# Patient Record
Sex: FEMALE | Race: WHITE | NOT HISPANIC OR LATINO | Employment: UNEMPLOYED | URBAN - NONMETROPOLITAN AREA
[De-identification: names, ages, dates, MRNs, and addresses within clinical notes are randomized per-mention and may not be internally consistent; named-entity substitution may affect disease eponyms.]

---

## 2017-03-23 ENCOUNTER — TRANSCRIBE ORDERS (OUTPATIENT)
Dept: ADMINISTRATIVE | Facility: HOSPITAL | Age: 67
End: 2017-03-23

## 2017-03-23 DIAGNOSIS — Z12.31 VISIT FOR SCREENING MAMMOGRAM: Primary | ICD-10-CM

## 2017-03-23 DIAGNOSIS — Z13.820 SCREENING FOR OSTEOPOROSIS: ICD-10-CM

## 2017-04-27 ENCOUNTER — HOSPITAL ENCOUNTER (OUTPATIENT)
Dept: MAMMOGRAPHY | Facility: HOSPITAL | Age: 67
Discharge: HOME | End: 2017-04-27
Admitting: NURSE PRACTITIONER

## 2017-04-27 ENCOUNTER — HOSPITAL ENCOUNTER (OUTPATIENT)
Dept: BONE DENSITY | Facility: HOSPITAL | Age: 67
Discharge: HOME | End: 2017-04-27

## 2017-04-27 DIAGNOSIS — Z12.31 VISIT FOR SCREENING MAMMOGRAM: ICD-10-CM

## 2017-04-27 DIAGNOSIS — Z13.820 SCREENING FOR OSTEOPOROSIS: ICD-10-CM

## 2017-04-27 PROCEDURE — 77063 BREAST TOMOSYNTHESIS BI: CPT

## 2017-04-27 PROCEDURE — 77080 DXA BONE DENSITY AXIAL: CPT

## 2017-04-27 PROCEDURE — G0202 SCR MAMMO BI INCL CAD: HCPCS | Performed by: RADIOLOGY

## 2017-04-27 PROCEDURE — 77080 DXA BONE DENSITY AXIAL: CPT | Performed by: RADIOLOGY

## 2017-04-27 PROCEDURE — G0202 SCR MAMMO BI INCL CAD: HCPCS

## 2017-04-27 PROCEDURE — 77063 BREAST TOMOSYNTHESIS BI: CPT | Performed by: RADIOLOGY

## 2017-05-09 ENCOUNTER — APPOINTMENT (OUTPATIENT)
Dept: ULTRASOUND IMAGING | Facility: HOSPITAL | Age: 67
End: 2017-05-09
Attending: RADIOLOGY

## 2017-05-10 ENCOUNTER — HOSPITAL ENCOUNTER (OUTPATIENT)
Dept: ULTRASOUND IMAGING | Facility: HOSPITAL | Age: 67
Discharge: HOME | End: 2017-05-10
Attending: RADIOLOGY | Admitting: RADIOLOGY

## 2017-05-10 DIAGNOSIS — R92.8 ABNORMAL MAMMOGRAM: ICD-10-CM

## 2017-05-10 PROCEDURE — 76642 ULTRASOUND BREAST LIMITED: CPT

## 2017-05-10 PROCEDURE — 76642 ULTRASOUND BREAST LIMITED: CPT | Performed by: RADIOLOGY

## 2018-05-11 ENCOUNTER — HOSPITAL ENCOUNTER (OUTPATIENT)
Dept: MAMMOGRAPHY | Facility: HOSPITAL | Age: 68
Discharge: HOME | End: 2018-05-11
Admitting: NURSE PRACTITIONER

## 2018-05-11 DIAGNOSIS — Z12.39 BREAST CANCER SCREENING: ICD-10-CM

## 2018-05-11 PROCEDURE — 77067 SCR MAMMO BI INCL CAD: CPT

## 2018-05-11 PROCEDURE — 77067 SCR MAMMO BI INCL CAD: CPT | Performed by: RADIOLOGY

## 2018-05-11 PROCEDURE — 77063 BREAST TOMOSYNTHESIS BI: CPT | Performed by: RADIOLOGY

## 2018-05-11 PROCEDURE — 77063 BREAST TOMOSYNTHESIS BI: CPT

## 2018-11-15 ENCOUNTER — HOSPITAL ENCOUNTER (EMERGENCY)
Facility: HOSPITAL | Age: 68
Discharge: HOME | End: 2018-11-15
Attending: EMERGENCY MEDICINE | Admitting: EMERGENCY MEDICINE

## 2018-11-15 ENCOUNTER — APPOINTMENT (OUTPATIENT)
Dept: CT IMAGING | Facility: HOSPITAL | Age: 68
End: 2018-11-15

## 2018-11-15 ENCOUNTER — APPOINTMENT (OUTPATIENT)
Dept: GENERAL RADIOLOGY | Facility: HOSPITAL | Age: 68
End: 2018-11-15

## 2018-11-15 VITALS
SYSTOLIC BLOOD PRESSURE: 148 MMHG | BODY MASS INDEX: 26.66 KG/M2 | RESPIRATION RATE: 20 BRPM | HEART RATE: 81 BPM | OXYGEN SATURATION: 97 % | HEIGHT: 65 IN | TEMPERATURE: 98 F | DIASTOLIC BLOOD PRESSURE: 78 MMHG | WEIGHT: 160 LBS

## 2018-11-15 DIAGNOSIS — R42 VERTIGO: Primary | ICD-10-CM

## 2018-11-15 LAB
ALBUMIN SERPL-MCNC: 4.6 G/DL (ref 3.4–4.8)
ALBUMIN/GLOB SERPL: 1.6 G/DL (ref 1.5–2.5)
ALP SERPL-CCNC: 79 U/L (ref 35–104)
ALT SERPL W P-5'-P-CCNC: 21 U/L (ref 10–36)
ANION GAP SERPL CALCULATED.3IONS-SCNC: 5.3 MMOL/L (ref 3.6–11.2)
AST SERPL-CCNC: 23 U/L (ref 10–30)
BACTERIA UR QL AUTO: ABNORMAL /HPF
BASOPHILS # BLD AUTO: 0.04 10*3/MM3 (ref 0–0.3)
BASOPHILS NFR BLD AUTO: 0.5 % (ref 0–2)
BILIRUB SERPL-MCNC: 0.4 MG/DL (ref 0.2–1.8)
BILIRUB UR QL STRIP: NEGATIVE
BNP SERPL-MCNC: 10 PG/ML (ref 0–100)
BUN BLD-MCNC: 10 MG/DL (ref 7–21)
BUN/CREAT SERPL: 12 (ref 7–25)
CALCIUM SPEC-SCNC: 9.6 MG/DL (ref 7.7–10)
CHLORIDE SERPL-SCNC: 105 MMOL/L (ref 99–112)
CLARITY UR: CLEAR
CO2 SERPL-SCNC: 26.7 MMOL/L (ref 24.3–31.9)
COLOR UR: YELLOW
CREAT BLD-MCNC: 0.83 MG/DL (ref 0.43–1.29)
DEPRECATED RDW RBC AUTO: 42.4 FL (ref 37–54)
EOSINOPHIL # BLD AUTO: 0.08 10*3/MM3 (ref 0–0.7)
EOSINOPHIL NFR BLD AUTO: 0.9 % (ref 0–7)
ERYTHROCYTE [DISTWIDTH] IN BLOOD BY AUTOMATED COUNT: 12.8 % (ref 11.5–14.5)
GFR SERPL CREATININE-BSD FRML MDRD: 68 ML/MIN/1.73
GLOBULIN UR ELPH-MCNC: 2.8 GM/DL
GLUCOSE BLD-MCNC: 103 MG/DL (ref 70–110)
GLUCOSE UR STRIP-MCNC: NEGATIVE MG/DL
HCT VFR BLD AUTO: 50.7 % (ref 37–47)
HGB BLD-MCNC: 16.8 G/DL (ref 12–16)
HGB UR QL STRIP.AUTO: NEGATIVE
HYALINE CASTS UR QL AUTO: ABNORMAL /LPF
IMM GRANULOCYTES # BLD: 0.02 10*3/MM3 (ref 0–0.03)
IMM GRANULOCYTES NFR BLD: 0.2 % (ref 0–0.5)
KETONES UR QL STRIP: NEGATIVE
LEUKOCYTE ESTERASE UR QL STRIP.AUTO: ABNORMAL
LYMPHOCYTES # BLD AUTO: 1.35 10*3/MM3 (ref 1–3)
LYMPHOCYTES NFR BLD AUTO: 16 % (ref 16–46)
MCH RBC QN AUTO: 30.6 PG (ref 27–33)
MCHC RBC AUTO-ENTMCNC: 33.1 G/DL (ref 33–37)
MCV RBC AUTO: 92.3 FL (ref 80–94)
MONOCYTES # BLD AUTO: 0.64 10*3/MM3 (ref 0.1–0.9)
MONOCYTES NFR BLD AUTO: 7.6 % (ref 0–12)
NEUTROPHILS # BLD AUTO: 6.33 10*3/MM3 (ref 1.4–6.5)
NEUTROPHILS NFR BLD AUTO: 74.8 % (ref 40–75)
NITRITE UR QL STRIP: NEGATIVE
OSMOLALITY SERPL CALC.SUM OF ELEC: 273.1 MOSM/KG (ref 273–305)
PH UR STRIP.AUTO: 6.5 [PH] (ref 5–8)
PLATELET # BLD AUTO: 229 10*3/MM3 (ref 130–400)
PMV BLD AUTO: 12.5 FL (ref 6–10)
POTASSIUM BLD-SCNC: 4.3 MMOL/L (ref 3.5–5.3)
PROT SERPL-MCNC: 7.4 G/DL (ref 6–8)
PROT UR QL STRIP: NEGATIVE
RBC # BLD AUTO: 5.49 10*6/MM3 (ref 4.2–5.4)
RBC # UR: ABNORMAL /HPF
REF LAB TEST METHOD: ABNORMAL
SODIUM BLD-SCNC: 137 MMOL/L (ref 135–153)
SP GR UR STRIP: <=1.005 (ref 1–1.03)
SQUAMOUS #/AREA URNS HPF: ABNORMAL /HPF
TROPONIN I SERPL-MCNC: <0.006 NG/ML
UROBILINOGEN UR QL STRIP: ABNORMAL
WBC NRBC COR # BLD: 8.46 10*3/MM3 (ref 4.5–12.5)
WBC UR QL AUTO: ABNORMAL /HPF

## 2018-11-15 PROCEDURE — 71045 X-RAY EXAM CHEST 1 VIEW: CPT

## 2018-11-15 PROCEDURE — 84484 ASSAY OF TROPONIN QUANT: CPT | Performed by: PHYSICIAN ASSISTANT

## 2018-11-15 PROCEDURE — 71045 X-RAY EXAM CHEST 1 VIEW: CPT | Performed by: RADIOLOGY

## 2018-11-15 PROCEDURE — 81001 URINALYSIS AUTO W/SCOPE: CPT | Performed by: PHYSICIAN ASSISTANT

## 2018-11-15 PROCEDURE — 70450 CT HEAD/BRAIN W/O DYE: CPT | Performed by: RADIOLOGY

## 2018-11-15 PROCEDURE — 93005 ELECTROCARDIOGRAM TRACING: CPT | Performed by: PHYSICIAN ASSISTANT

## 2018-11-15 PROCEDURE — 85025 COMPLETE CBC W/AUTO DIFF WBC: CPT | Performed by: PHYSICIAN ASSISTANT

## 2018-11-15 PROCEDURE — 80053 COMPREHEN METABOLIC PANEL: CPT | Performed by: PHYSICIAN ASSISTANT

## 2018-11-15 PROCEDURE — 70450 CT HEAD/BRAIN W/O DYE: CPT

## 2018-11-15 PROCEDURE — 99284 EMERGENCY DEPT VISIT MOD MDM: CPT

## 2018-11-15 PROCEDURE — 83880 ASSAY OF NATRIURETIC PEPTIDE: CPT | Performed by: PHYSICIAN ASSISTANT

## 2018-11-15 PROCEDURE — 93010 ELECTROCARDIOGRAM REPORT: CPT | Performed by: INTERNAL MEDICINE

## 2018-11-15 RX ORDER — MECLIZINE HYDROCHLORIDE 25 MG/1
25 TABLET ORAL EVERY 6 HOURS PRN
Qty: 20 TABLET | Refills: 0 | Status: SHIPPED | OUTPATIENT
Start: 2018-11-15

## 2018-11-15 RX ORDER — SODIUM CHLORIDE 0.9 % (FLUSH) 0.9 %
10 SYRINGE (ML) INJECTION AS NEEDED
Status: DISCONTINUED | OUTPATIENT
Start: 2018-11-15 | End: 2018-11-15 | Stop reason: HOSPADM

## 2018-11-15 RX ORDER — MECLIZINE HCL 12.5 MG/1
25 TABLET ORAL ONCE
Status: COMPLETED | OUTPATIENT
Start: 2018-11-15 | End: 2018-11-15

## 2018-11-15 RX ADMIN — MECLIZINE HYDROCHLORIDE 25 MG: 12.5 TABLET, FILM COATED ORAL at 13:34

## 2018-11-15 NOTE — ED NOTES
Cardiac monitor showing sinus rhythm 75....p-75 r-20 b/p 155/75     Camelia Sosa, RN  11/15/18 9513

## 2018-11-15 NOTE — ED PROVIDER NOTES
Subjective   68-year-old female who presents to the ED today for dizziness.  This started around 11 AM.  She states she was sitting in a chair and began to feel very dizzy.  She states everything was spinning.  She states she tried to get up to move to a different chair and she was stumbling around.  She states she sat down in a recliner and laid back and that helped.  She states the symptoms lasted for about 5 or 10 minutes and then went away.  She states she has had similar episodes in the past but never this bad.  She denies any nausea or vomiting.  She denies any headache.  She states she did feel short of breath while she was dizzy.  She denies any chest pain.  She states she did take an aspirin prior to arrival.        History provided by:  Patient  Dizziness   Quality:  Room spinning and head spinning  Severity:  Severe  Onset quality:  Sudden  Duration: 5-10 minutes.  Timing:  Constant  Progression:  Improving  Chronicity:  Recurrent  Context: inactivity    Relieved by:  Lying down  Worsened by:  Movement and standing up  Associated symptoms: shortness of breath    Associated symptoms: no blood in stool, no chest pain, no diarrhea, no headaches, no hearing loss, no nausea, no palpitations, no syncope, no tinnitus, no vision changes, no vomiting and no weakness        Review of Systems   Constitutional: Negative.    HENT: Negative for hearing loss and tinnitus.    Eyes: Negative.    Respiratory: Positive for shortness of breath.    Cardiovascular: Negative for chest pain, palpitations and syncope.   Gastrointestinal: Negative for blood in stool, diarrhea, nausea and vomiting.   Genitourinary: Negative.    Musculoskeletal: Negative.    Skin: Negative.    Neurological: Positive for dizziness. Negative for weakness and headaches.   Psychiatric/Behavioral: Negative.    All other systems reviewed and are negative.      Past Medical History:   Diagnosis Date   • Disease of thyroid gland    • Hyperlipidemia    •  Hypertension        Allergies   Allergen Reactions   • Sulfa Antibiotics Nausea Only   • Pneumococcal Vaccines Swelling       Past Surgical History:   Procedure Laterality Date   • CATARACT EXTRACTION     • CHOLECYSTECTOMY     • CORNEAL TRANSPLANT     • EYE SURGERY     • HYSTERECTOMY         Family History   Problem Relation Age of Onset   • Breast cancer Neg Hx        Social History     Socioeconomic History   • Marital status:      Spouse name: Not on file   • Number of children: Not on file   • Years of education: Not on file   • Highest education level: Not on file   Tobacco Use   • Smoking status: Heavy Tobacco Smoker   Substance and Sexual Activity   • Alcohol use: Yes   • Drug use: No           Objective   Physical Exam   Constitutional: She is oriented to person, place, and time. She appears well-developed and well-nourished. No distress.   HENT:   Head: Normocephalic and atraumatic.   Right Ear: External ear normal.   Left Ear: External ear normal.   Nose: Nose normal.   Mouth/Throat: Oropharynx is clear and moist.   Eyes: Conjunctivae and EOM are normal. Pupils are equal, round, and reactive to light.   Neck: Normal range of motion. Neck supple.   Cardiovascular: Normal rate, regular rhythm, normal heart sounds and intact distal pulses.   Pulmonary/Chest: Effort normal and breath sounds normal. No stridor. She has no wheezes. She has no rales.   Abdominal: Soft. Bowel sounds are normal. There is no tenderness.   Musculoskeletal: Normal range of motion.   Neurological: She is alert and oriented to person, place, and time. No cranial nerve deficit or sensory deficit. She exhibits normal muscle tone. Coordination normal.   Skin: Skin is warm and dry. Capillary refill takes less than 2 seconds.   Psychiatric: She has a normal mood and affect. Her behavior is normal. Judgment and thought content normal.   Nursing note and vitals reviewed.      Procedures           ED Course  ED Course as of Nov 15 1617    Thu Nov 15, 2018   1409 13:56 - sinus rhythm, rate of 66, no acute ischemia, reviewed by Dr. Zheng ECG 12 Lead []   1434 Patient feeling better, all symptoms resolved.  Will discharge home on Meclizine and have her follow up outpatient as needed.  []      ED Course User Index  [] Estrella Cordero, PA                  MDM  Number of Diagnoses or Management Options  Vertigo:      Amount and/or Complexity of Data Reviewed  Clinical lab tests: reviewed  Tests in the radiology section of CPT®: reviewed  Tests in the medicine section of CPT®: reviewed    Patient Progress  Patient progress: improved        Final diagnoses:   Vertigo            Estrella Cordero PA  11/15/18 3072

## 2018-12-14 DIAGNOSIS — M25.521 RIGHT ELBOW PAIN: Primary | ICD-10-CM

## 2018-12-17 ENCOUNTER — OFFICE VISIT (OUTPATIENT)
Dept: ORTHOPEDIC SURGERY | Facility: CLINIC | Age: 68
End: 2018-12-17

## 2018-12-17 ENCOUNTER — HOSPITAL ENCOUNTER (OUTPATIENT)
Dept: GENERAL RADIOLOGY | Facility: HOSPITAL | Age: 68
Discharge: HOME | End: 2018-12-17
Attending: ORTHOPAEDIC SURGERY | Admitting: ORTHOPAEDIC SURGERY

## 2018-12-17 VITALS
HEIGHT: 66 IN | BODY MASS INDEX: 25.71 KG/M2 | SYSTOLIC BLOOD PRESSURE: 117 MMHG | WEIGHT: 160 LBS | DIASTOLIC BLOOD PRESSURE: 82 MMHG | HEART RATE: 80 BPM

## 2018-12-17 DIAGNOSIS — M70.21 OLECRANON BURSITIS, RIGHT ELBOW: Primary | ICD-10-CM

## 2018-12-17 DIAGNOSIS — M25.521 RIGHT ELBOW PAIN: ICD-10-CM

## 2018-12-17 PROCEDURE — 73080 X-RAY EXAM OF ELBOW: CPT | Performed by: RADIOLOGY

## 2018-12-17 PROCEDURE — 99406 BEHAV CHNG SMOKING 3-10 MIN: CPT | Performed by: ORTHOPAEDIC SURGERY

## 2018-12-17 PROCEDURE — 20605 DRAIN/INJ JOINT/BURSA W/O US: CPT | Performed by: ORTHOPAEDIC SURGERY

## 2018-12-17 PROCEDURE — 99203 OFFICE O/P NEW LOW 30 MIN: CPT | Performed by: ORTHOPAEDIC SURGERY

## 2018-12-17 PROCEDURE — 73080 X-RAY EXAM OF ELBOW: CPT

## 2018-12-17 RX ORDER — ASPIRIN 81 MG/1
TABLET ORAL
Refills: 1 | COMMUNITY
Start: 2018-10-12

## 2018-12-17 RX ORDER — LISINOPRIL 20 MG/1
1 TABLET ORAL
COMMUNITY
Start: 2018-12-04

## 2018-12-17 RX ORDER — PREDNISOLONE ACETATE 10 MG/ML
SUSPENSION/ DROPS OPHTHALMIC
Refills: 6 | COMMUNITY
Start: 2018-10-12

## 2018-12-17 RX ADMIN — LIDOCAINE HYDROCHLORIDE 5 ML: 20 INJECTION, SOLUTION INFILTRATION; PERINEURAL at 13:52

## 2018-12-17 RX ADMIN — METHYLPREDNISOLONE ACETATE 40 MG: 40 INJECTION, SUSPENSION INTRA-ARTICULAR; INTRALESIONAL; INTRAMUSCULAR; SOFT TISSUE at 13:52

## 2018-12-17 NOTE — PROGRESS NOTES
New Patient Visit      Patient: Christal Perez  YOB: 1950  Date of Encounter: 12/17/2018        Chief Complaint:   Chief Complaint   Patient presents with   • Right Elbow - Edema       HPI:   Christal Perez, 68 y.o. female, referred by Jose Coley APRN presents for evaluation of right elbow pain and swelling.  She's had a few episodes this past summer bumping her elbow, this seemed to make her pain worse.  When she developed a small swelling there, she had several other minor injuries which again aggravated the swelling and pain.  She denies fever chills or night sweats..  Denies swelling in her elbow in the past.    Active Problem List:  Patient Active Problem List   Diagnosis   • Hypertension   • Right elbow pain   • Osteoporosis       Past Medical History:  Past Medical History:   Diagnosis Date   • Disease of thyroid gland    • Hyperlipidemia    • Hypertension        Past Surgical History:  Past Surgical History:   Procedure Laterality Date   • CATARACT EXTRACTION     • CHOLECYSTECTOMY     • CORNEAL TRANSPLANT     • EYE SURGERY     • HYSTERECTOMY         Family History:  Family History   Problem Relation Age of Onset   • Breast cancer Neg Hx        Social History:  Social History     Socioeconomic History   • Marital status:      Spouse name: Not on file   • Number of children: Not on file   • Years of education: Not on file   • Highest education level: Not on file   Social Needs   • Financial resource strain: Not on file   • Food insecurity - worry: Not on file   • Food insecurity - inability: Not on file   • Transportation needs - medical: Not on file   • Transportation needs - non-medical: Not on file   Occupational History   • Not on file   Tobacco Use   • Smoking status: Heavy Tobacco Smoker   • Smokeless tobacco: Never Used   Substance and Sexual Activity   • Alcohol use: Yes   • Drug use: No   • Sexual activity: Not on file   Other Topics Concern   • Not on file   Social History  "Narrative   • Not on file     Patient's Body mass index is 26.22 kg/m². BMI is above normal parameters. Recommendations include: educational material.  I advised Christal of the risks of continuing to use tobacco, and I provided her with tobacco cessation educational materials in the After Visit Summary.     During this visit, I spent 3 minutes counseling the patient regarding tobacco cessation.        Medications:  Current Outpatient Medications   Medication Sig Dispense Refill   • aspirin 81 MG EC tablet TAKE ONE TABLET BY MOUTH EVERY DAY FOR CIRCULATION  1   • lisinopril (PRINIVIL,ZESTRIL) 20 MG tablet Take 1 tablet by mouth.     • meclizine (ANTIVERT) 25 MG tablet Take 1 tablet by mouth Every 6 (Six) Hours As Needed for dizziness. 20 tablet 0   • prednisoLONE acetate (PRED FORTE) 1 % ophthalmic suspension INSTILL 1 DROP TO THE RIGHT EYE EVERY DAY  6     No current facility-administered medications for this visit.        Allergies:  Allergies   Allergen Reactions   • Sulfa Antibiotics Nausea Only   • Pneumococcal Vaccines Swelling       Review of Systems:   Review of Systems   Constitutional: Negative.    HENT: Positive for hearing loss.    Eyes: Positive for redness.   Respiratory: Positive for cough.    Cardiovascular: Negative.    Gastrointestinal: Negative.    Endocrine: Negative.    Genitourinary: Negative.    Musculoskeletal: Positive for arthralgias, back pain and joint swelling.   Skin: Negative.    Allergic/Immunologic: Negative.    Neurological: Negative.    Hematological: Negative.    Psychiatric/Behavioral: Negative.        Physical Exam:   Physical Exam  GENERAL: 68 y.o. female, alert and oriented X 3 in no acute distress.   Visit Vitals  /82   Pulse 80   Ht 166.4 cm (65.5\")   Wt 72.6 kg (160 lb)   BMI 26.22 kg/m²     Musculoskeletal: Right elbow evaluation reveals full mobility no instability normal neurovascular status. She has 3 x 4 cm mass posterior aspect of the olecranon with mild " surrounding erythema and warmth.  Neurovascular grossly intact.      Radiology/Labs: Radiographs right elbow AP lateral are negative by report and by review.       Assessment & Plan:   68 y.o. female with olecranon bursitis right elbow, today she underwent aspiration removing 8 cc of serous fluid nonpurulent then injecting Depo-Medrol 40 mg within the olecranon bursa.  Anticipate good response, she is provided a compression dressing today to be removed tonight or tomorrow. She'll follow-up as needed.      ICD-10-CM ICD-9-CM   1. Olecranon bursitis, right elbow M70.21 726.33   2. Right elbow pain M25.521 719.42       Medium Joint Arthrocentesis: R olecranon bursa  Date/Time: 12/17/2018 1:52 PM  Consent given by: patient  Site marked: site marked  Timeout: Immediately prior to procedure a time out was called to verify the correct patient, procedure, equipment, support staff and site/side marked as required   Supporting Documentation  Indications: pain and joint swelling   Procedure Details  Location: elbow - R olecranon bursa  Preparation: Patient was prepped and draped in the usual sterile fashion  Needle size: 18 G  Approach: anterolateral  Medications administered: 5 mL lidocaine 2%; 40 mg methylPREDNISolone acetate 40 MG/ML  Aspirate amount: 8 mL  Aspirate: serous  Patient tolerance: patient tolerated the procedure well with no immediate complications                Cc:   Jose Coley APRN          Scribed for Lion Ga MD by Vanessa Ga RN.1:51 PM 12/17/2018

## 2018-12-18 PROBLEM — M25.521 RIGHT ELBOW PAIN: Status: ACTIVE | Noted: 2018-12-18

## 2018-12-18 PROBLEM — I10 HYPERTENSION: Status: ACTIVE | Noted: 2018-12-18

## 2018-12-18 PROBLEM — M81.0 OSTEOPOROSIS: Status: ACTIVE | Noted: 2018-12-18

## 2018-12-19 RX ORDER — LIDOCAINE HYDROCHLORIDE 20 MG/ML
5 INJECTION, SOLUTION INFILTRATION; PERINEURAL
Status: COMPLETED | OUTPATIENT
Start: 2018-12-17 | End: 2018-12-17

## 2018-12-19 RX ORDER — METHYLPREDNISOLONE ACETATE 40 MG/ML
40 INJECTION, SUSPENSION INTRA-ARTICULAR; INTRALESIONAL; INTRAMUSCULAR; SOFT TISSUE
Status: COMPLETED | OUTPATIENT
Start: 2018-12-17 | End: 2018-12-17

## 2019-05-14 ENCOUNTER — HOSPITAL ENCOUNTER (OUTPATIENT)
Dept: MAMMOGRAPHY | Facility: HOSPITAL | Age: 69
Discharge: HOME | End: 2019-05-14
Admitting: NURSE PRACTITIONER

## 2019-05-14 DIAGNOSIS — Z12.39 SCREENING BREAST EXAMINATION: ICD-10-CM

## 2019-05-14 PROCEDURE — 77063 BREAST TOMOSYNTHESIS BI: CPT

## 2019-05-14 PROCEDURE — 77067 SCR MAMMO BI INCL CAD: CPT | Performed by: RADIOLOGY

## 2019-05-14 PROCEDURE — 77063 BREAST TOMOSYNTHESIS BI: CPT | Performed by: RADIOLOGY

## 2019-05-14 PROCEDURE — 77067 SCR MAMMO BI INCL CAD: CPT

## 2019-07-24 ENCOUNTER — TRANSCRIBE ORDERS (OUTPATIENT)
Dept: ADMINISTRATIVE | Facility: HOSPITAL | Age: 69
End: 2019-07-24

## 2019-07-24 DIAGNOSIS — E04.9 THYROID GOITER: Primary | ICD-10-CM

## 2019-07-29 ENCOUNTER — HOSPITAL ENCOUNTER (OUTPATIENT)
Dept: ULTRASOUND IMAGING | Facility: HOSPITAL | Age: 69
Discharge: HOME | End: 2019-07-29
Admitting: NURSE PRACTITIONER

## 2019-07-29 DIAGNOSIS — E04.9 THYROID GOITER: ICD-10-CM

## 2019-07-29 PROCEDURE — 76536 US EXAM OF HEAD AND NECK: CPT

## 2019-07-29 PROCEDURE — 76536 US EXAM OF HEAD AND NECK: CPT | Performed by: RADIOLOGY

## 2024-04-10 ENCOUNTER — TRANSCRIBE ORDERS (OUTPATIENT)
Dept: ADMINISTRATIVE | Facility: HOSPITAL | Age: 74
End: 2024-04-10
Payer: MEDICARE

## 2024-04-10 ENCOUNTER — HOSPITAL ENCOUNTER (OUTPATIENT)
Dept: CARDIOLOGY | Facility: HOSPITAL | Age: 74
Discharge: HOME | End: 2024-04-10
Payer: MEDICARE

## 2024-04-10 ENCOUNTER — HOSPITAL ENCOUNTER (OUTPATIENT)
Facility: HOSPITAL | Age: 74
Discharge: HOME | End: 2024-04-10
Payer: MEDICARE

## 2024-04-10 ENCOUNTER — HOSPITAL ENCOUNTER (OUTPATIENT)
Dept: CT IMAGING | Facility: HOSPITAL | Age: 74
Discharge: HOME | End: 2024-04-10
Payer: MEDICARE

## 2024-04-10 DIAGNOSIS — R60.0 LOCALIZED EDEMA: ICD-10-CM

## 2024-04-10 DIAGNOSIS — R05.9 COUGH, UNSPECIFIED TYPE: ICD-10-CM

## 2024-04-10 DIAGNOSIS — R91.1 PULMONARY NODULE: ICD-10-CM

## 2024-04-10 DIAGNOSIS — R91.1 PULMONARY NODULE: Primary | ICD-10-CM

## 2024-04-10 PROCEDURE — 93971 EXTREMITY STUDY: CPT | Performed by: RADIOLOGY

## 2024-04-10 PROCEDURE — 93971 EXTREMITY STUDY: CPT

## 2024-04-10 PROCEDURE — 71260 CT THORAX DX C+: CPT

## 2024-04-10 PROCEDURE — 25510000001 IOPAMIDOL 61 % SOLUTION: Performed by: NURSE PRACTITIONER

## 2024-04-10 RX ADMIN — IOPAMIDOL 80 ML: 612 INJECTION, SOLUTION INTRAVENOUS at 13:43

## 2024-04-29 LAB — CREAT BLDA-MCNC: 0.8 MG/DL (ref 0.6–1.3)

## 2024-07-29 ENCOUNTER — OFFICE VISIT (OUTPATIENT)
Dept: ENDOCRINOLOGY | Facility: CLINIC | Age: 74
End: 2024-07-29
Payer: MEDICARE

## 2024-07-29 VITALS
OXYGEN SATURATION: 94 % | SYSTOLIC BLOOD PRESSURE: 110 MMHG | WEIGHT: 152.8 LBS | HEIGHT: 65 IN | HEART RATE: 74 BPM | DIASTOLIC BLOOD PRESSURE: 73 MMHG | BODY MASS INDEX: 25.46 KG/M2

## 2024-07-29 DIAGNOSIS — E05.90 HYPERTHYROIDISM: Primary | ICD-10-CM

## 2024-07-29 PROCEDURE — 84443 ASSAY THYROID STIM HORMONE: CPT | Performed by: NURSE PRACTITIONER

## 2024-07-29 PROCEDURE — 86800 THYROGLOBULIN ANTIBODY: CPT | Performed by: NURSE PRACTITIONER

## 2024-07-29 PROCEDURE — 83520 IMMUNOASSAY QUANT NOS NONAB: CPT | Performed by: NURSE PRACTITIONER

## 2024-07-29 PROCEDURE — 84439 ASSAY OF FREE THYROXINE: CPT | Performed by: NURSE PRACTITIONER

## 2024-07-29 PROCEDURE — 84481 FREE ASSAY (FT-3): CPT | Performed by: NURSE PRACTITIONER

## 2024-07-29 PROCEDURE — 86376 MICROSOMAL ANTIBODY EACH: CPT | Performed by: NURSE PRACTITIONER

## 2024-07-29 PROCEDURE — 84445 ASSAY OF TSI GLOBULIN: CPT | Performed by: NURSE PRACTITIONER

## 2024-07-29 RX ORDER — ALBUTEROL SULFATE 90 UG/1
AEROSOL, METERED RESPIRATORY (INHALATION)
COMMUNITY

## 2024-07-29 NOTE — ASSESSMENT & PLAN NOTE
-Clinically euthyroid.  -TFT's repeated today with antibody testing to determine underlying cause of hyperthyroidism.  -Discussed underlying causes of hyperthyroidism with patient.  Will repeat ultrasound thyroid to determine size of make-up of nodules present.  Discussed that there may be a need for another uptake and scan depending on the results of this ultrasound.  -Will determine treatment plan and follow based on underlying cause of hypothyroidism.  -Follow-up in 3 months.

## 2024-07-29 NOTE — PROGRESS NOTES
Chief Complaint   Patient presents with    Hyperthyroidism    Multiple Thyroid Nodules        Referring Provider  No ref. provider found     HPI   Christal Perez is a 74 y.o. female had concerns including Hyperthyroidism and Multiple Thyroid Nodules.   Seen as a new patient.  Hyperthyroidism.  Thyroid nodules.    Patient reports that she was seen by Ernesto and Ward Mcgregor in the past and was told that she had hyperthyroidism at that time.  They were not able to determine underlying cause at that time and patient has not been on any medication for hyperthyroidism.  She did have a negative uptake and scan at this time.  She reports that her levels regulated and did not require medication therapy.  She has been followed by PCP for some time.  She reports that recently she was noted to have abnormal TSH 0.10 L on 05/31/2024.  Reports that she has not had an ultrasound thyroid in some time.  She was sent here for further evaluation and treatment.    Birth state: [place]  Previous history of radiation to face/neck: none  Consuming foods high in iodine: none  Family history of thyroid complications: brother- hypothyroidism, no history of thyroid cancer.    The following portions of the patient's history were reviewed and updated as appropriate: allergies, current medications, past family history, past medical history, past social history, past surgical history, and problem list.  Past Medical History:   Diagnosis Date    Disease of thyroid gland     Hyperlipidemia     Hypertension      Past Surgical History:   Procedure Laterality Date    CATARACT EXTRACTION      CHOLECYSTECTOMY      CORNEAL TRANSPLANT      EYE SURGERY      HYSTERECTOMY        Family History   Problem Relation Age of Onset    Breast cancer Neg Hx       Social History     Socioeconomic History    Marital status:    Tobacco Use    Smoking status: Heavy Smoker    Smokeless tobacco: Never   Substance and Sexual Activity    Alcohol use: Yes    Drug use: No     "  Allergies   Allergen Reactions    Sulfa Antibiotics Nausea Only    Pneumococcal Vaccines Swelling      Current Outpatient Medications on File Prior to Visit   Medication Sig Dispense Refill    albuterol sulfate  (90 Base) MCG/ACT inhaler Inhalation for 17 Days      aspirin 81 MG EC tablet TAKE ONE TABLET BY MOUTH EVERY DAY FOR CIRCULATION  1    lisinopril (PRINIVIL,ZESTRIL) 20 MG tablet Take 1 tablet by mouth.      meclizine (ANTIVERT) 25 MG tablet Take 1 tablet by mouth Every 6 (Six) Hours As Needed for dizziness. 20 tablet 0    prednisoLONE acetate (PRED FORTE) 1 % ophthalmic suspension INSTILL 1 DROP TO THE RIGHT EYE EVERY DAY  6     No current facility-administered medications on file prior to visit.      Review of Systems   Constitutional:  Positive for fatigue. Negative for unexpected weight gain and unexpected weight loss.   Eyes:         Cataract surgery, cornea transplant   Respiratory:  Positive for shortness of breath.    Cardiovascular:  Negative for palpitations.   Endocrine: Negative for cold intolerance and heat intolerance.   Neurological:  Positive for tremors.   Psychiatric/Behavioral:  Positive for sleep disturbance. Negative for agitation. The patient is not nervous/anxious.    All other systems reviewed and are negative.    /73 (BP Location: Right arm, Patient Position: Sitting, Cuff Size: Adult)   Pulse 74   Ht 165.1 cm (65\")   Wt 69.3 kg (152 lb 12.8 oz)   SpO2 94%   BMI 25.43 kg/m²      Physical Exam  Vitals reviewed.   Constitutional:       Appearance: Normal appearance.   Eyes:      Extraocular Movements: Extraocular movements intact.   Neck:      Thyroid: Thyroid mass, thyromegaly and thyroid tenderness present.   Cardiovascular:      Rate and Rhythm: Normal rate.   Pulmonary:      Effort: Pulmonary effort is normal.   Skin:     General: Skin is warm.   Neurological:      General: No focal deficit present.      Mental Status: She is alert and oriented to person, place, " "and time.      Comments: Mild tremor noted.   Psychiatric:         Mood and Affect: Mood normal.         Behavior: Behavior normal.         Thought Content: Thought content normal.         Judgment: Judgment normal.       Labs/Imaging  CMP  Lab Results   Component Value Date    GLUCOSE 103 11/15/2018    BUN 10 11/15/2018    CREATININE 0.80 04/10/2024    EGFRIFNONA 68 11/15/2018    BCR 12.0 11/15/2018    K 4.3 11/15/2018    CO2 26.7 11/15/2018    CALCIUM 9.6 11/15/2018    ALBUMIN 4.60 11/15/2018    AST 23 11/15/2018    ALT 21 11/15/2018        CBC w/DIFF   Lab Results   Component Value Date    WBC 8.46 11/15/2018    RBC 5.49 (H) 11/15/2018    HGB 16.8 (H) 11/15/2018    HCT 50.7 (H) 11/15/2018    MCV 92.3 11/15/2018    MCH 30.6 11/15/2018    MCHC 33.1 11/15/2018    RDW 12.8 11/15/2018    RDWSD 42.4 11/15/2018    MPV 12.5 (H) 11/15/2018     11/15/2018    NEUTRORELPCT 74.8 11/15/2018    LYMPHORELPCT 16.0 11/15/2018    MONORELPCT 7.6 11/15/2018    EOSRELPCT 0.9 11/15/2018    BASORELPCT 0.5 11/15/2018    AUTOIGPER 0.2 11/15/2018    NEUTROABS 6.33 11/15/2018    LYMPHSABS 1.35 11/15/2018    MONOSABS 0.64 11/15/2018    EOSABS 0.08 11/15/2018    BASOSABS 0.04 11/15/2018    AUTOIGNUM 0.02 11/15/2018       TSH  Lab Results   Component Value Date    TSH 0.188 (L) 01/05/2016       T4  Lab Results   Component Value Date    FREET4 1.17 01/05/2016     No results found for: \"E4ICTXB\"    T3  No results found for: \"T3FREE\"  No results found for: \"W1NAJPH\"    TRAb  No results found for: \"TSURCPAB\"    TPO  No results found for: \"THYROIDAB\"    No valid procedures specified.    Assessment and Plan    Diagnoses and all orders for this visit:    1. Hyperthyroidism (Primary)  Assessment & Plan:  -Clinically euthyroid.  -TFT's repeated today with antibody testing to determine underlying cause of hyperthyroidism.  -Discussed underlying causes of hyperthyroidism with patient.  Will repeat ultrasound thyroid to determine size of make-up of " nodules present.  Discussed that there may be a need for another uptake and scan depending on the results of this ultrasound.  -Will determine treatment plan and follow based on underlying cause of hypothyroidism.  -Follow-up in 3 months.      Orders:  -     US Thyroid  -     TSH  -     T4, free  -     T3, Free  -     Thyroid Stimulating Immunoglobulin  -     Thyrotropin Receptor Antibody  -     Thyroid Antibodies         Return in about 3 months (around 10/29/2024) for Follow-up appointment. The patient was instructed to contact the clinic with any interval questions or concerns.      This document has been electronically signed by DAVID Garcia  July 29, 2024 16:49 EDT   Endocrinology    Please note that portions of this document were completed with a voice recognition program. Efforts were made to edit the dictations, but occasionally words are mis-transcribed.

## 2024-07-30 ENCOUNTER — OFFICE VISIT (OUTPATIENT)
Dept: PULMONOLOGY | Facility: CLINIC | Age: 74
End: 2024-07-30
Payer: MEDICARE

## 2024-07-30 VITALS
HEART RATE: 83 BPM | WEIGHT: 154.6 LBS | SYSTOLIC BLOOD PRESSURE: 128 MMHG | BODY MASS INDEX: 24.85 KG/M2 | TEMPERATURE: 97.9 F | DIASTOLIC BLOOD PRESSURE: 78 MMHG | HEIGHT: 66 IN | OXYGEN SATURATION: 94 %

## 2024-07-30 DIAGNOSIS — R91.1 PULMONARY NODULE: Primary | ICD-10-CM

## 2024-07-30 DIAGNOSIS — F17.200 SMOKER: ICD-10-CM

## 2024-07-30 DIAGNOSIS — R06.02 SOB (SHORTNESS OF BREATH): ICD-10-CM

## 2024-07-30 DIAGNOSIS — E66.3 OVERWEIGHT (BMI 25.0-29.9): ICD-10-CM

## 2024-07-30 LAB
T3FREE SERPL-MCNC: 3.03 PG/ML (ref 2–4.4)
T4 FREE SERPL-MCNC: 1.43 NG/DL (ref 0.92–1.68)
THYROGLOB AB SERPL-ACNC: <1 IU/ML (ref 0–0.9)
THYROPEROXIDASE AB SERPL-ACNC: <9 IU/ML (ref 0–34)
TSH SERPL DL<=0.05 MIU/L-ACNC: 0.12 UIU/ML (ref 0.27–4.2)

## 2024-07-30 PROCEDURE — 3078F DIAST BP <80 MM HG: CPT | Performed by: INTERNAL MEDICINE

## 2024-07-30 PROCEDURE — 99203 OFFICE O/P NEW LOW 30 MIN: CPT | Performed by: INTERNAL MEDICINE

## 2024-07-30 PROCEDURE — 3074F SYST BP LT 130 MM HG: CPT | Performed by: INTERNAL MEDICINE

## 2024-07-30 NOTE — PROGRESS NOTES
Subjective    Christal Perez presents for the following Lung Nodule (5 mm nodule posteriorly in the right lung)  .    History of Present Illness   Were you born premature?  no    Any Childhood infections? yes      Breathing problems when you were a child? yes    Any childhood allergies?    no             At what age did you begin smoking? 15    Smoking marijuana? no    Any IV drugs? no    How many packs per day? 1    Lung Function Test? no  Chest X-Ray? yes    CT Chest? yes Allergy Test? no    Family hx of Lung disease or Lung Cancer?yes    If FHx is posivitive for lung cancer, what is the relationship of the family member? father and brother(s)    Any hospitalization in the last year? no    How far can you walk without getting short of breath? 175 feet    Any coughing? yes    Any wheezing? yes    Acid Reflux? no    Do you snore? Unknown     Daytime Fatigue? yes    Any pets? no   Any pet allergies? 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     Have you been exposed to any chemicals at your job? no    What inhalers are you currently using? Albuterol     Above-mentioned questionnaire was reviewed by me in great detail and discussed with patient  Review of Systems  Positive for shortness of breath on moderate exertion otherwise negative.  Active Problems:  Problem List Items Addressed This Visit          Endocrine and Metabolic    Overweight (BMI 25.0-29.9)       Pulmonary and Pneumonias    Pulmonary nodule - Primary    Relevant Orders    CT Chest Without Contrast    SOB (shortness of breath)    Relevant Orders    Complete PFT - Pre & Post Bronchodilator       Tobacco    Smoker    Relevant Orders    Complete PFT - Pre & Post Bronchodilator       Past Medical History:  Past Medical History:   Diagnosis Date    Disease of thyroid gland     Hyperlipidemia     Hypertension        Family History:  Family History   Problem Relation Age of Onset    Lymphoma Mother     Lung cancer Father     Lung cancer Brother      "Breast cancer Neg Hx        Social History:  Social History     Tobacco Use    Smoking status: Heavy Smoker     Current packs/day: 1.00     Average packs/day: 1 pack/day for 59.6 years (59.6 ttl pk-yrs)     Types: Cigarettes     Start date: 1965     Passive exposure: Current    Smokeless tobacco: Never   Substance Use Topics    Alcohol use: Not Currently       Current Medications:  Current Outpatient Medications   Medication Sig Dispense Refill    albuterol sulfate  (90 Base) MCG/ACT inhaler Inhalation for 17 Days      aspirin 81 MG EC tablet TAKE ONE TABLET BY MOUTH EVERY DAY FOR CIRCULATION  1    lisinopril (PRINIVIL,ZESTRIL) 20 MG tablet Take 1 tablet by mouth.      prednisoLONE acetate (PRED FORTE) 1 % ophthalmic suspension INSTILL 1 DROP TO THE RIGHT EYE EVERY DAY  6    meclizine (ANTIVERT) 25 MG tablet Take 1 tablet by mouth Every 6 (Six) Hours As Needed for dizziness. (Patient not taking: Reported on 7/30/2024) 20 tablet 0     No current facility-administered medications for this visit.       Allergies:  Allergies   Allergen Reactions    Sulfa Antibiotics Nausea Only    Pneumococcal Vaccines Swelling       Vitals:  /78   Pulse 83   Temp 97.9 °F (36.6 °C)   Ht 166.4 cm (65.5\")   Wt 70.1 kg (154 lb 9.6 oz)   SpO2 94%   BMI 25.34 kg/m²     Imaging:    Imaging Results (Most Recent)       None            Pulmonary Functions Testing Results:    No results found for: \"FEV1\", \"FVC\", \"KHU9MQO\", \"TLC\", \"DLCO\"    Results for orders placed or performed in visit on 07/29/24   TSH    Specimen: Blood   Result Value Ref Range    TSH 0.123 (L) 0.270 - 4.200 uIU/mL   T4, free    Specimen: Blood   Result Value Ref Range    Free T4 1.43 0.92 - 1.68 ng/dL   T3, Free    Specimen: Blood   Result Value Ref Range    T3, Free 3.03 2.00 - 4.40 pg/mL   Thyroid Antibodies    Specimen: Blood   Result Value Ref Range    Thyroid Peroxidase Antibody <9 0 - 34 IU/mL    Thyroglobulin Ab <1.0 0.0 - 0.9 IU/mL       Objective "   Physical Exam  General- normal in appearance, not in any acute distress    HEENT- pupils equally reactive to light, normal in size, no scleral icterus    Neck-supple    Respiratory-respirations normal-on auscultation no wheezing no crackles,     Cardiovascular-  Normal S1 and S2. No S3, S4 or murmurs. No JVD, no carotid bruit and no edema, pulses normal bilaterally     GI-nontender nondistended bowel sounds positive    CNS-nonfocal    Musculoskeletal -no edema  Extremities- no obvious deformity noticed     Psychiatric-mood good, good eye contact, alert awake oriented  Skin- no visible rash      Appointment Information    PACS Images     Radiology Images  Study Result    Narrative & Impression   EXAM: CT CHEST W CONTRAST DIAGNOSTIC-      CLINICAL INDICATION:r91.1,r05.9; R91.1-Solitary pulmonary nodule;  R05.9-Cough, unspecified      COMPARISON: None immediately available.     TECHNIQUE: Multiple axial CT images were obtained from lung apex through  upper abdomen WITH administration of IV contrast. Reformatted images in  the coronal and/or sagittal plane(s) were generated from the axial data  set to facilitate diagnostic accuracy and/or surgical planning.     Radiation dose reduction techniques were utilized per ALARA protocol.  Automated exposure control was initiated through either or CareDose or  DoseRigNovaPlanner software packages by  protocol.    DOSE (DLP mGy-cm):        FINDINGS:     LUNGS: 5 mm nodule posteriorly in the right lung image 64 of axial  series 2     HEART: Unremarkable.     MEDIASTINUM: No masses. No enlarged lymph nodes.  No fluid collections.     PLEURA: No pleural effusion. No pleural mass or abnormal calcification.  No pneumothorax.     VASCULATURE: No evidence of aneurysm.      BONES: Anterolateral right fourth rib fracture of indeterminate age.     VISUALIZED UPPER ABDOMEN:The upper abdomen is unremarkable as  visualized.     Other: None.     IMPRESSION:     1. Proximal nodule  posteriorly in the right lung could represent pleural  scarring.  2. Other findings as above     Assessment & Plan   Pulmonary nodule-will repeat CT chest in 6 months.  Likely related to pleurisy patient had.  As per the patient she had another CAT scan earlier and the pulmonary nodule was 11 mm.  Currently patient is asymptomatic and does not have any chest pain while she takes deeper breaths.      Smoker-did extensive smoking cessation counseling.  Patient is not ready to quit yet.    Shortness of breath-likely related to longstanding smoking history.  Will get PFTs and treat accordingly.      Uxyowsujyp-[id number] to stay active.          ICD-10-CM ICD-9-CM   1. Pulmonary nodule  R91.1 793.11   2. Smoker  F17.200 305.1   3. SOB (shortness of breath)  R06.02 786.05   4. Overweight (BMI 25.0-29.9)  E66.3 278.02       Return in about 3 months (around 10/30/2024).

## 2024-07-31 LAB — TSI SER-ACNC: <0.1 IU/L (ref 0–0.55)

## 2024-08-01 LAB — TSH RECEP AB SER-ACNC: <1.1 IU/L (ref 0–1.75)

## 2024-08-05 RX ORDER — METHIMAZOLE 5 MG/1
5 TABLET ORAL EVERY OTHER DAY
Qty: 45 TABLET | Refills: 1 | Status: SHIPPED | OUTPATIENT
Start: 2024-08-05 | End: 2025-08-05

## 2024-09-03 ENCOUNTER — HOSPITAL ENCOUNTER (OUTPATIENT)
Dept: RESPIRATORY THERAPY | Facility: HOSPITAL | Age: 74
Discharge: HOME | End: 2024-09-03
Admitting: INTERNAL MEDICINE
Payer: MEDICARE

## 2024-09-03 VITALS — HEART RATE: 67 BPM | RESPIRATION RATE: 20 BRPM | OXYGEN SATURATION: 95 %

## 2024-09-03 DIAGNOSIS — R06.02 SOB (SHORTNESS OF BREATH): ICD-10-CM

## 2024-09-03 DIAGNOSIS — F17.200 SMOKER: ICD-10-CM

## 2024-09-03 PROCEDURE — 94726 PLETHYSMOGRAPHY LUNG VOLUMES: CPT

## 2024-09-03 PROCEDURE — 94640 AIRWAY INHALATION TREATMENT: CPT

## 2024-09-03 PROCEDURE — 94060 EVALUATION OF WHEEZING: CPT | Performed by: INTERNAL MEDICINE

## 2024-09-03 PROCEDURE — 94729 DIFFUSING CAPACITY: CPT | Performed by: INTERNAL MEDICINE

## 2024-09-03 PROCEDURE — 94726 PLETHYSMOGRAPHY LUNG VOLUMES: CPT | Performed by: INTERNAL MEDICINE

## 2024-09-03 PROCEDURE — 94060 EVALUATION OF WHEEZING: CPT

## 2024-09-03 PROCEDURE — 94729 DIFFUSING CAPACITY: CPT

## 2024-09-03 PROCEDURE — 94799 UNLISTED PULMONARY SVC/PX: CPT

## 2024-09-03 RX ORDER — ALBUTEROL SULFATE 0.83 MG/ML
2.5 SOLUTION RESPIRATORY (INHALATION) ONCE
Status: COMPLETED | OUTPATIENT
Start: 2024-09-03 | End: 2024-09-03

## 2024-09-03 RX ADMIN — ALBUTEROL SULFATE 2.5 MG: 2.5 SOLUTION RESPIRATORY (INHALATION) at 11:11

## 2024-09-20 ENCOUNTER — HOSPITAL ENCOUNTER (OUTPATIENT)
Dept: ULTRASOUND IMAGING | Facility: HOSPITAL | Age: 74
Discharge: HOME | End: 2024-09-20
Payer: MEDICARE

## 2024-09-20 PROCEDURE — 76536 US EXAM OF HEAD AND NECK: CPT

## 2024-10-30 ENCOUNTER — OFFICE VISIT (OUTPATIENT)
Dept: PULMONOLOGY | Facility: CLINIC | Age: 74
End: 2024-10-30
Payer: MEDICARE

## 2024-10-30 VITALS
OXYGEN SATURATION: 93 % | HEIGHT: 66 IN | SYSTOLIC BLOOD PRESSURE: 128 MMHG | BODY MASS INDEX: 24.11 KG/M2 | WEIGHT: 150 LBS | HEART RATE: 71 BPM | TEMPERATURE: 97.8 F | DIASTOLIC BLOOD PRESSURE: 78 MMHG

## 2024-10-30 DIAGNOSIS — R91.1 PULMONARY NODULE: Primary | ICD-10-CM

## 2024-10-30 DIAGNOSIS — J44.1 COPD WITH ACUTE EXACERBATION: ICD-10-CM

## 2024-10-30 DIAGNOSIS — J44.89 ASTHMA-COPD OVERLAP SYNDROME: ICD-10-CM

## 2024-10-30 DIAGNOSIS — F17.200 SMOKER: ICD-10-CM

## 2024-10-30 PROBLEM — H18.513 FUCHS' CORNEAL DYSTROPHY OF BOTH EYES: Status: ACTIVE | Noted: 2024-10-30

## 2024-10-30 PROCEDURE — 99214 OFFICE O/P EST MOD 30 MIN: CPT | Performed by: PHYSICIAN ASSISTANT

## 2024-10-30 PROCEDURE — 3074F SYST BP LT 130 MM HG: CPT | Performed by: PHYSICIAN ASSISTANT

## 2024-10-30 PROCEDURE — 3078F DIAST BP <80 MM HG: CPT | Performed by: PHYSICIAN ASSISTANT

## 2024-10-30 PROCEDURE — 1159F MED LIST DOCD IN RCRD: CPT | Performed by: PHYSICIAN ASSISTANT

## 2024-10-30 PROCEDURE — 1160F RVW MEDS BY RX/DR IN RCRD: CPT | Performed by: PHYSICIAN ASSISTANT

## 2024-10-30 RX ORDER — PREDNISONE 10 MG/1
20 TABLET ORAL DAILY
Qty: 10 TABLET | Refills: 0 | Status: SHIPPED | OUTPATIENT
Start: 2024-10-30 | End: 2024-11-04

## 2024-10-30 RX ORDER — DOXYCYCLINE 100 MG/1
100 CAPSULE ORAL 2 TIMES DAILY
Qty: 10 CAPSULE | Refills: 0 | Status: SHIPPED | OUTPATIENT
Start: 2024-10-30 | End: 2024-11-04

## 2024-10-30 NOTE — PROGRESS NOTES
"Chief Complaint  Pulmonary nodule    Subjective        Christal Perez presents to [facility]ICINE  History of Present Illness    Mrs. Siegel presents today for follow up of a pulmonary nodule.   She admits that she hs noticed more increased shortness of breath with exertion over time.   Does notice some acute increase of chest congestion and general more shortness of breath within the last few days. Prefers to reduce prednisone use when possible, but will follow any provided recommendations.  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          Objective   Vital Signs:  /78   Pulse 71   Temp 97.8 °F (36.6 °C)   Ht 166.4 cm (65.51\")   Wt 68 kg (150 lb)   SpO2 93%   BMI 24.57 kg/m²   Estimated body mass index is 24.57 kg/m² as calculated from the following:    Height as of this encounter: 166.4 cm (65.51\").    Weight as of this encounter: 68 kg (150 lb).    BMI is within normal parameters. No other follow-up for BMI required.      Physical Exam  Vitals reviewed.   Constitutional:       General: She is not in acute distress.     Appearance: She is not diaphoretic.   HENT:      Head: Normocephalic and atraumatic.   Cardiovascular:      Rate and Rhythm: Normal rate and regular rhythm.   Pulmonary:      Effort: Pulmonary effort is normal.      Breath sounds: Wheezing present. No rhonchi or rales.   Neurological:      Mental Status: She is alert and oriented to person, place, and time.   Psychiatric:         Behavior: Behavior normal.        Result Review :  The following data was reviewed by: Veronica Petersen PA-C on 10/30/2024:      PFT 2024    Moderate obstructive lung disease with significant bronchodilator effect seen on this occasion.  Diffusion capacity is normal.  Mildly reduced.  Lung volumes show significant air trapping.  Flow-volume loop is obstructive    "     -----------------------------------------------------------------------------------    CT chest imaging/report April 2024   - Image 64 - right lower 5 mm nodule (could so be scarring/atelectasis, covers images 63-66).   - Images 44-54 - Left lingular scarring/atelectasis    -----------------------------------------------------------------------------------          Assessment and Plan   Diagnoses and all orders for this visit:    1. Pulmonary nodule (Primary)    2. Asthma-COPD overlap syndrome    3. COPD with acute exacerbation    4. Smoker    Other orders  -     doxycycline (VIBRAMYCIN) 100 MG capsule; Take 1 capsule by mouth 2 (Two) Times a Day for 5 days.  Dispense: 10 capsule; Refill: 0  -     predniSONE (DELTASONE) 10 MG tablet; Take 2 tablets by mouth Daily for 5 days.  Dispense: 10 tablet; Refill: 0        Pulmonary nodule, Cigarette dependendence:   CT chest from April 2024 notable for   - Image 64 - right lower 5 mm nodule (could so be scarring/atelectasis, covers images 63-66).   - Images 44-54 - Left lingular scarring/atelectasis  No prior imaging for comparison  Follow-up imaging ordered during previous visit by Dr. Watts for January 2025.          COPD-asthma overlap with acute exacerbation:   Has moderate COPD with asthmatic overlap per recent PFT.   Discussed results.   Prefers to try more mild inhaler options, avoid steroids unless   Continue albuterol inhaler as needed  Provided sample of Spiriva 1.25 mcg for 2 puffs daily use.   If sample tolerated, can prescribe or escalate therapy  Ordered 5 day doxycyline course  Ordered 5 day prednisone course (recommended 20 mg, but can reduce to 10 mg if better tolerate     Prefers to start minimal as tolerated with therapies, [facility]          Follow Up   Return in about 2 months (around 1/3/2025), or if symptoms worsen or fail to improve, for Recheck.  Patient was given instructions and counseling regarding her condition or for health maintenance advice.  Please see specific information pulled into the AVS if appropriate.

## 2024-10-31 PROBLEM — J44.1 COPD WITH ACUTE EXACERBATION: Status: ACTIVE | Noted: 2024-10-31

## 2024-10-31 PROBLEM — J44.89 ASTHMA-COPD OVERLAP SYNDROME: Status: ACTIVE | Noted: 2024-10-31

## 2024-11-05 ENCOUNTER — DOCUMENTATION (OUTPATIENT)
Dept: PULMONOLOGY | Facility: CLINIC | Age: 74
End: 2024-11-05
Payer: MEDICARE

## 2024-11-05 DIAGNOSIS — J44.89 ASTHMA-COPD OVERLAP SYNDROME: Primary | ICD-10-CM

## 2024-11-05 RX ORDER — TIOTROPIUM BROMIDE INHALATION SPRAY 1.56 UG/1
2 SPRAY, METERED RESPIRATORY (INHALATION)
Qty: 4 G | Refills: 8 | Status: SHIPPED | OUTPATIENT
Start: 2024-11-05 | End: 2024-11-06 | Stop reason: SDUPTHER

## 2024-11-05 NOTE — PROGRESS NOTES
Patient had benefit from the Spiriva Respimat 1.25 mcg and wanted to continue this.  Order sent to pharmacy.      Recently acquired a sample of the Spiriva 2.5 mcg.  MA will let her know that she can try this with 1 to 2 puffs daily.  If better tolerated, we can later upgrade to this instead.

## 2024-11-06 DIAGNOSIS — J44.89 ASTHMA-COPD OVERLAP SYNDROME: ICD-10-CM

## 2024-11-06 RX ORDER — TIOTROPIUM BROMIDE INHALATION SPRAY 1.56 UG/1
2 SPRAY, METERED RESPIRATORY (INHALATION)
Qty: 4 G | Refills: 8 | Status: SHIPPED | OUTPATIENT
Start: 2024-11-06

## 2024-12-04 ENCOUNTER — OFFICE VISIT (OUTPATIENT)
Dept: ENDOCRINOLOGY | Facility: CLINIC | Age: 74
End: 2024-12-04
Payer: MEDICARE

## 2024-12-04 VITALS
DIASTOLIC BLOOD PRESSURE: 70 MMHG | BODY MASS INDEX: 25.49 KG/M2 | SYSTOLIC BLOOD PRESSURE: 133 MMHG | WEIGHT: 155.6 LBS | OXYGEN SATURATION: 91 % | HEART RATE: 66 BPM

## 2024-12-04 DIAGNOSIS — E05.90 HYPERTHYROIDISM: Primary | ICD-10-CM

## 2024-12-04 LAB
ALBUMIN SERPL-MCNC: 4 G/DL (ref 3.5–5.2)
ALBUMIN/GLOB SERPL: 1.3 G/DL
ALP SERPL-CCNC: 79 U/L (ref 39–117)
ALT SERPL W P-5'-P-CCNC: 14 U/L (ref 1–33)
ANION GAP SERPL CALCULATED.3IONS-SCNC: 8.6 MMOL/L (ref 5–15)
AST SERPL-CCNC: 19 U/L (ref 1–32)
BASOPHILS # BLD AUTO: 0.08 10*3/MM3 (ref 0–0.2)
BASOPHILS NFR BLD AUTO: 1.1 % (ref 0–1.5)
BILIRUB SERPL-MCNC: 0.4 MG/DL (ref 0–1.2)
BUN SERPL-MCNC: 9 MG/DL (ref 8–23)
BUN/CREAT SERPL: 11.4 (ref 7–25)
CALCIUM SPEC-SCNC: 9.6 MG/DL (ref 8.6–10.5)
CHLORIDE SERPL-SCNC: 99 MMOL/L (ref 98–107)
CO2 SERPL-SCNC: 27.4 MMOL/L (ref 22–29)
CREAT SERPL-MCNC: 0.79 MG/DL (ref 0.57–1)
DEPRECATED RDW RBC AUTO: 40.3 FL (ref 37–54)
EGFRCR SERPLBLD CKD-EPI 2021: 78.6 ML/MIN/1.73
EOSINOPHIL # BLD AUTO: 0.22 10*3/MM3 (ref 0–0.4)
EOSINOPHIL NFR BLD AUTO: 3.1 % (ref 0.3–6.2)
ERYTHROCYTE [DISTWIDTH] IN BLOOD BY AUTOMATED COUNT: 12 % (ref 12.3–15.4)
GLOBULIN UR ELPH-MCNC: 3 GM/DL
GLUCOSE SERPL-MCNC: 99 MG/DL (ref 65–99)
HCT VFR BLD AUTO: 49.6 % (ref 34–46.6)
HGB BLD-MCNC: 16.1 G/DL (ref 12–15.9)
IMM GRANULOCYTES # BLD AUTO: 0.02 10*3/MM3 (ref 0–0.05)
IMM GRANULOCYTES NFR BLD AUTO: 0.3 % (ref 0–0.5)
LYMPHOCYTES # BLD AUTO: 1.46 10*3/MM3 (ref 0.7–3.1)
LYMPHOCYTES NFR BLD AUTO: 20.8 % (ref 19.6–45.3)
MCH RBC QN AUTO: 29.9 PG (ref 26.6–33)
MCHC RBC AUTO-ENTMCNC: 32.5 G/DL (ref 31.5–35.7)
MCV RBC AUTO: 92 FL (ref 79–97)
MONOCYTES # BLD AUTO: 0.74 10*3/MM3 (ref 0.1–0.9)
MONOCYTES NFR BLD AUTO: 10.6 % (ref 5–12)
NEUTROPHILS NFR BLD AUTO: 4.49 10*3/MM3 (ref 1.7–7)
NEUTROPHILS NFR BLD AUTO: 64.1 % (ref 42.7–76)
NRBC BLD AUTO-RTO: 0 /100 WBC (ref 0–0.2)
PLATELET # BLD AUTO: 283 10*3/MM3 (ref 140–450)
PMV BLD AUTO: 12.5 FL (ref 6–12)
POTASSIUM SERPL-SCNC: 4.2 MMOL/L (ref 3.5–5.2)
PROT SERPL-MCNC: 7 G/DL (ref 6–8.5)
RBC # BLD AUTO: 5.39 10*6/MM3 (ref 3.77–5.28)
SODIUM SERPL-SCNC: 135 MMOL/L (ref 136–145)
T3FREE SERPL-MCNC: 3.05 PG/ML (ref 2–4.4)
T4 FREE SERPL-MCNC: 1.43 NG/DL (ref 0.92–1.68)
TSH SERPL DL<=0.05 MIU/L-ACNC: 0.59 UIU/ML (ref 0.27–4.2)
WBC NRBC COR # BLD AUTO: 7.01 10*3/MM3 (ref 3.4–10.8)

## 2024-12-04 PROCEDURE — 84439 ASSAY OF FREE THYROXINE: CPT | Performed by: NURSE PRACTITIONER

## 2024-12-04 PROCEDURE — 85025 COMPLETE CBC W/AUTO DIFF WBC: CPT | Performed by: NURSE PRACTITIONER

## 2024-12-04 PROCEDURE — 84443 ASSAY THYROID STIM HORMONE: CPT | Performed by: NURSE PRACTITIONER

## 2024-12-04 PROCEDURE — 84481 FREE ASSAY (FT-3): CPT | Performed by: NURSE PRACTITIONER

## 2024-12-04 PROCEDURE — 80053 COMPREHEN METABOLIC PANEL: CPT | Performed by: NURSE PRACTITIONER

## 2024-12-04 NOTE — PROGRESS NOTES
Chief Complaint   Patient presents with    Follow-up        Referring Provider  No ref. provider found     HPI   Christal Perez is a 74 y.o. female had concerns including Follow-up.   Seen as a new patient.  Hyperthyroidism.  Thyroid nodules.    Methimazole 5 mg QOD.  She is noticing improving heart palpitations.    History:  Patient reports that she was seen by Ernesto and Ward Mcgregor in the past and was told that she had hyperthyroidism at that time.  They were not able to determine underlying cause at that time and patient has not been on any medication for hyperthyroidism.  She did have a negative uptake and scan at this time.  She reports that her levels regulated and did not require medication therapy.  She has been followed by PCP for some time.  She reports that recently she was noted to have abnormal TSH 0.10 L on 05/31/2024.  Reports that she has not had an ultrasound thyroid in some time.  She was sent here for further evaluation and treatment.    Birth state: [place]  Previous history of radiation to face/neck: none  Consuming foods high in iodine: none  Family history of thyroid complications: brother- hypothyroidism, no history of thyroid cancer.    The following portions of the patient's history were reviewed and updated as appropriate: allergies, current medications, past family history, past medical history, past social history, past surgical history, and problem list.  Past Medical History:   Diagnosis Date    Disease of thyroid gland     Hyperlipidemia     Hypertension      Past Surgical History:   Procedure Laterality Date    CATARACT EXTRACTION      CHOLECYSTECTOMY      CORNEAL TRANSPLANT      EYE SURGERY      HYSTERECTOMY        Family History   Problem Relation Age of Onset    Lymphoma Mother     Lung cancer Father     Lung cancer Brother     Breast cancer Neg Hx       Social History     Socioeconomic History    Marital status:    Tobacco Use    Smoking status: Heavy Smoker     Current  packs/day: 1.00     Average packs/day: 1 pack/day for 59.9 years (59.9 ttl pk-yrs)     Types: Cigarettes     Start date: 1965     Passive exposure: Current    Smokeless tobacco: Never   Vaping Use    Vaping status: Former    Substances: Nicotine, Flavoring    Devices: Disposable   Substance and Sexual Activity    Alcohol use: Not Currently    Drug use: Yes     Types: Hydromorphone    Sexual activity: Defer      Allergies   Allergen Reactions    Pneumococcal Vaccines Swelling     Swollen where vaccination was given, injection site got red, and itching.    Sulfa Antibiotics Nausea Only    Statins Other (See Comments)     Leg pain and heaviness in legs.      Current Outpatient Medications on File Prior to Visit   Medication Sig Dispense Refill    albuterol sulfate  (90 Base) MCG/ACT inhaler Inhalation for 17 Days      lisinopril (PRINIVIL,ZESTRIL) 20 MG tablet Take 1 tablet by mouth.      methIMAzole (TAPAZOLE) 5 MG tablet Take 1 tablet by mouth Every Other Day. 45 tablet 1    prednisoLONE acetate (PRED FORTE) 1 % ophthalmic suspension INSTILL 1 DROP TO THE RIGHT EYE EVERY DAY  6    Tiotropium Bromide Monohydrate (Spiriva Respimat) 1.25 MCG/ACT aerosol solution inhaler Inhale 2 puffs Daily. 4 g 8    [DISCONTINUED] aspirin 81 MG EC tablet TAKE ONE TABLET BY MOUTH EVERY DAY FOR CIRCULATION (Patient not taking: Reported on 12/4/2024)  1    [DISCONTINUED] meclizine (ANTIVERT) 25 MG tablet Take 1 tablet by mouth Every 6 (Six) Hours As Needed for dizziness. (Patient not taking: Reported on 7/30/2024) 20 tablet 0     No current facility-administered medications on file prior to visit.      Review of Systems   Constitutional:  Positive for fatigue. Negative for unexpected weight gain and unexpected weight loss.   Eyes:         Cataract surgery, cornea transplant   Respiratory:  Positive for shortness of breath.    Cardiovascular:  Negative for palpitations.   Endocrine: Negative for cold intolerance and heat  intolerance.   Neurological:  Positive for tremors.   Psychiatric/Behavioral:  Positive for sleep disturbance. Negative for agitation. The patient is not nervous/anxious.    All other systems reviewed and are negative.    /70 (BP Location: Right arm, Patient Position: Sitting, Cuff Size: Adult)   Pulse 66   Wt 70.6 kg (155 lb 9.6 oz)   SpO2 91%   Breastfeeding No   BMI 25.49 kg/m²      Physical Exam  Vitals reviewed.   Constitutional:       Appearance: Normal appearance.   Eyes:      Extraocular Movements: Extraocular movements intact.   Neck:      Thyroid: Thyroid mass, thyromegaly and thyroid tenderness present.   Cardiovascular:      Rate and Rhythm: Normal rate.   Pulmonary:      Effort: Pulmonary effort is normal.   Skin:     General: Skin is warm.   Neurological:      General: No focal deficit present.      Mental Status: She is alert and oriented to person, place, and time.      Comments: Mild tremor noted.   Psychiatric:         Mood and Affect: Mood normal.         Behavior: Behavior normal.         Thought Content: Thought content normal.         Judgment: Judgment normal.       Labs/Imaging  CMP  Lab Results   Component Value Date    GLUCOSE 103 11/15/2018    BUN 10 11/15/2018    CREATININE 0.80 04/10/2024    EGFRIFNONA 68 11/15/2018    BCR 12.0 11/15/2018    K 4.3 11/15/2018    CO2 26.7 11/15/2018    CALCIUM 9.6 11/15/2018    ALBUMIN 4.60 11/15/2018    AST 23 11/15/2018    ALT 21 11/15/2018        CBC w/DIFF   Lab Results   Component Value Date    WBC 8.46 11/15/2018    RBC 5.49 (H) 11/15/2018    HGB 16.8 (H) 11/15/2018    HCT 50.7 (H) 11/15/2018    MCV 92.3 11/15/2018    MCH 30.6 11/15/2018    MCHC 33.1 11/15/2018    RDW 12.8 11/15/2018    RDWSD 42.4 11/15/2018    MPV 12.5 (H) 11/15/2018     11/15/2018    NEUTRORELPCT 74.8 11/15/2018    LYMPHORELPCT 16.0 11/15/2018    MONORELPCT 7.6 11/15/2018    EOSRELPCT 0.9 11/15/2018    BASORELPCT 0.5 11/15/2018    AUTOIGPER 0.2 11/15/2018     "NEUTROABS 6.33 11/15/2018    LYMPHSABS 1.35 11/15/2018    MONOSABS 0.64 11/15/2018    EOSABS 0.08 11/15/2018    BASOSABS 0.04 11/15/2018    AUTOIGNUM 0.02 11/15/2018       TSH  Lab Results   Component Value Date    TSH 0.123 (L) 07/29/2024    TSH 0.188 (L) 01/05/2016       T4  Lab Results   Component Value Date    FREET4 1.43 07/29/2024    FREET4 1.17 01/05/2016     No results found for: \"H2NEYKA\"    T3  Lab Results   Component Value Date    T3FREE 3.03 07/29/2024     No results found for: \"W9QMISJ\"    TRAb  Lab Results   Component Value Date    TSURCPAB <1.10 07/29/2024       TPO  Lab Results   Component Value Date    THYROIDAB <9 07/29/2024       No valid procedures specified.    Assessment and Plan    There are no diagnoses linked to this encounter.       No follow-ups on file. The patient was instructed to contact the clinic with any interval questions or concerns.      This document has been electronically signed by DAVID Garcia  December 4, 2024 10:00 EST   Endocrinology    Please note that portions of this document were completed with a voice recognition program. Efforts were made to edit the dictations, but occasionally words are mis-transcribed.   "

## 2024-12-04 NOTE — ASSESSMENT & PLAN NOTE
-Clinically euthyroid.  -TFT's today with medication adjustment accordingly.  -Discussed the importance of taking medication regularly without missing doses.  -US Thyroid due in 1 year for follow-up.  -Follow-up in 6 months.

## 2024-12-04 NOTE — PROGRESS NOTES
Chief Complaint   Patient presents with    Follow-up        Referring Provider  No ref. provider found     HPI   Christal Perez is a 74 y.o. female had concerns including Follow-up.   Hyperthyroidism.  Thyroid nodules.    She is currently taking Methimazole 5 mg QD.  She is taking this regularly without missing doses.  She denies any changes to her neck or compressive s/sx's.  She is not currently taking any conflicting medication at this time. She reports that she is having improving symptoms and that her heart palpitations are improving.  She denies any changes at this time.     History:  Patient reports that she was seen by Ernesto and Ward Mcgregor in the past and was told that she had hyperthyroidism at that time.  They were not able to determine underlying cause at that time and patient has not been on any medication for hyperthyroidism.  She did have a negative uptake and scan at this time.  She reports that her levels regulated and did not require medication therapy.  She has been followed by PCP for some time.  She reports that recently she was noted to have abnormal TSH 0.10 L on 05/31/2024.  Reports that she has not had an ultrasound thyroid in some time.  She was sent here for further evaluation and treatment.    Birth state: [place]  Previous history of radiation to face/neck: none  Consuming foods high in iodine: none  Family history of thyroid complications: brother- hypothyroidism, no history of thyroid cancer.    The following portions of the patient's history were reviewed and updated as appropriate: allergies, current medications, past family history, past medical history, past social history, past surgical history, and problem list.  Past Medical History:   Diagnosis Date    Disease of thyroid gland     Hyperlipidemia     Hypertension      Past Surgical History:   Procedure Laterality Date    CATARACT EXTRACTION      CHOLECYSTECTOMY      CORNEAL TRANSPLANT      EYE SURGERY      HYSTERECTOMY        Family  History   Problem Relation Age of Onset    Lymphoma Mother     Lung cancer Father     Lung cancer Brother     Breast cancer Neg Hx       Social History     Socioeconomic History    Marital status:    Tobacco Use    Smoking status: Heavy Smoker     Current packs/day: 1.00     Average packs/day: 1 pack/day for 59.9 years (59.9 ttl pk-yrs)     Types: Cigarettes     Start date: 1965     Passive exposure: Current    Smokeless tobacco: Never   Vaping Use    Vaping status: Former    Substances: Nicotine, Flavoring    Devices: Disposable   Substance and Sexual Activity    Alcohol use: Not Currently    Drug use: Yes     Types: Hydromorphone    Sexual activity: Defer      Allergies   Allergen Reactions    Pneumococcal Vaccines Swelling     Swollen where vaccination was given, injection site got red, and itching.    Sulfa Antibiotics Nausea Only    Statins Other (See Comments)     Leg pain and heaviness in legs.      Current Outpatient Medications on File Prior to Visit   Medication Sig Dispense Refill    albuterol sulfate  (90 Base) MCG/ACT inhaler Inhalation for 17 Days      lisinopril (PRINIVIL,ZESTRIL) 20 MG tablet Take 1 tablet by mouth.      methIMAzole (TAPAZOLE) 5 MG tablet Take 1 tablet by mouth Every Other Day. 45 tablet 1    prednisoLONE acetate (PRED FORTE) 1 % ophthalmic suspension INSTILL 1 DROP TO THE RIGHT EYE EVERY DAY  6    Tiotropium Bromide Monohydrate (Spiriva Respimat) 1.25 MCG/ACT aerosol solution inhaler Inhale 2 puffs Daily. 4 g 8    [DISCONTINUED] aspirin 81 MG EC tablet TAKE ONE TABLET BY MOUTH EVERY DAY FOR CIRCULATION (Patient not taking: Reported on 12/4/2024)  1    [DISCONTINUED] meclizine (ANTIVERT) 25 MG tablet Take 1 tablet by mouth Every 6 (Six) Hours As Needed for dizziness. (Patient not taking: Reported on 7/30/2024) 20 tablet 0     No current facility-administered medications on file prior to visit.      Review of Systems   Constitutional:  Positive for fatigue. Negative for  unexpected weight gain and unexpected weight loss.   Eyes:         Cataract surgery, cornea transplant   Respiratory:  Positive for shortness of breath.    Cardiovascular:  Negative for palpitations.   Endocrine: Negative for cold intolerance and heat intolerance.   Neurological:  Positive for tremors.   Psychiatric/Behavioral:  Positive for sleep disturbance. Negative for agitation. The patient is not nervous/anxious.    All other systems reviewed and are negative.    /70 (BP Location: Right arm, Patient Position: Sitting, Cuff Size: Adult)   Pulse 66   Wt 70.6 kg (155 lb 9.6 oz)   SpO2 91%   Breastfeeding No   BMI 25.49 kg/m²      Physical Exam  Vitals reviewed.   Constitutional:       Appearance: Normal appearance.   Eyes:      Extraocular Movements: Extraocular movements intact.   Neck:      Thyroid: Thyroid mass, thyromegaly and thyroid tenderness present.   Cardiovascular:      Rate and Rhythm: Normal rate.   Pulmonary:      Effort: Pulmonary effort is normal.   Skin:     General: Skin is warm.   Neurological:      General: No focal deficit present.      Mental Status: She is alert and oriented to person, place, and time.      Comments: Mild tremor noted.   Psychiatric:         Mood and Affect: Mood normal.         Behavior: Behavior normal.         Thought Content: Thought content normal.         Judgment: Judgment normal.       Labs/Imaging  CMP  Lab Results   Component Value Date    GLUCOSE 103 11/15/2018    BUN 10 11/15/2018    CREATININE 0.80 04/10/2024    EGFRIFNONA 68 11/15/2018    BCR 12.0 11/15/2018    K 4.3 11/15/2018    CO2 26.7 11/15/2018    CALCIUM 9.6 11/15/2018    ALBUMIN 4.60 11/15/2018    AST 23 11/15/2018    ALT 21 11/15/2018        CBC w/DIFF   Lab Results   Component Value Date    WBC 8.46 11/15/2018    RBC 5.49 (H) 11/15/2018    HGB 16.8 (H) 11/15/2018    HCT 50.7 (H) 11/15/2018    MCV 92.3 11/15/2018    MCH 30.6 11/15/2018    MCHC 33.1 11/15/2018    RDW 12.8 11/15/2018    RDWSD  "42.4 11/15/2018    MPV 12.5 (H) 11/15/2018     11/15/2018    NEUTRORELPCT 74.8 11/15/2018    LYMPHORELPCT 16.0 11/15/2018    MONORELPCT 7.6 11/15/2018    EOSRELPCT 0.9 11/15/2018    BASORELPCT 0.5 11/15/2018    AUTOIGPER 0.2 11/15/2018    NEUTROABS 6.33 11/15/2018    LYMPHSABS 1.35 11/15/2018    MONOSABS 0.64 11/15/2018    EOSABS 0.08 11/15/2018    BASOSABS 0.04 11/15/2018    AUTOIGNUM 0.02 11/15/2018       TSH  Lab Results   Component Value Date    TSH 0.123 (L) 07/29/2024    TSH 0.188 (L) 01/05/2016       T4  Lab Results   Component Value Date    FREET4 1.43 07/29/2024    FREET4 1.17 01/05/2016     No results found for: \"V0OPRCJ\"    T3  Lab Results   Component Value Date    T3FREE 3.03 07/29/2024     No results found for: \"X2NBZPE\"    TRAb  Lab Results   Component Value Date    TSURCPAB <1.10 07/29/2024       TPO  Lab Results   Component Value Date    THYROIDAB <9 07/29/2024       No valid procedures specified.    Assessment and Plan    Diagnoses and all orders for this visit:    1. Hyperthyroidism (Primary)  Assessment & Plan:  -Clinically euthyroid.  -TFT's today with medication adjustment accordingly.  -Discussed the importance of taking medication regularly without missing doses.  -US Thyroid due in 1 year for follow-up.  -Follow-up in 6 months.    Orders:  -     TSH  -     T4, free  -     Comprehensive Metabolic Panel  -     CBC & Differential  -     T3, Free           Return in about 6 months (around 6/4/2025) for Follow-up appointment. The patient was instructed to contact the clinic with any interval questions or concerns.      This document has been electronically signed by DAVID Garcia  December 4, 2024 10:16 EST   Endocrinology    Please note that portions of this document were completed with a voice recognition program. Efforts were made to edit the dictations, but occasionally words are mis-transcribed.   "

## 2024-12-09 RX ORDER — METHIMAZOLE 5 MG/1
5 TABLET ORAL EVERY OTHER DAY
Qty: 45 TABLET | Refills: 1 | Status: SHIPPED | OUTPATIENT
Start: 2024-12-09 | End: 2025-12-09

## 2025-01-03 ENCOUNTER — OFFICE VISIT (OUTPATIENT)
Dept: PULMONOLOGY | Facility: CLINIC | Age: 75
End: 2025-01-03
Payer: MEDICARE

## 2025-01-03 VITALS
WEIGHT: 155 LBS | SYSTOLIC BLOOD PRESSURE: 124 MMHG | HEIGHT: 66 IN | TEMPERATURE: 96.9 F | BODY MASS INDEX: 24.91 KG/M2 | DIASTOLIC BLOOD PRESSURE: 78 MMHG | HEART RATE: 81 BPM

## 2025-01-03 DIAGNOSIS — J44.89 ASTHMA-COPD OVERLAP SYNDROME: Primary | ICD-10-CM

## 2025-01-03 DIAGNOSIS — R91.1 PULMONARY NODULE: ICD-10-CM

## 2025-01-03 DIAGNOSIS — F17.200 SMOKER: ICD-10-CM

## 2025-01-03 PROCEDURE — 3078F DIAST BP <80 MM HG: CPT | Performed by: PHYSICIAN ASSISTANT

## 2025-01-03 PROCEDURE — 99214 OFFICE O/P EST MOD 30 MIN: CPT | Performed by: PHYSICIAN ASSISTANT

## 2025-01-03 PROCEDURE — 3074F SYST BP LT 130 MM HG: CPT | Performed by: PHYSICIAN ASSISTANT

## 2025-01-03 NOTE — PROGRESS NOTES
"Chief Complaint  Pulmonary nodule (Couldn't get an O2 reading )    Subjective        Christal Perez presents to [facility]  History of Present Illness      Mrs. Perez presents today for follow up evaluation.   Recovered from initial bronchitis episode noted during prior follow up visit. Noticed benefit from from the Spiriva 1.25 mcg. Did end up trying another sample of Spiriva 2.5 mcg and then noticed increased bronchitis, hoarseness, and difficulty with urination.   Decreased back to the Spiriva 1.25 and 1 puff per day, and this has been tolerated best.     Also tried Breztri in the past but this was not well tolerated too.     Would like to continue on minimal tolerated therapy at this time.         Objective   Vital Signs:  /78   Pulse 81   Temp 96.9 °F (36.1 °C)   Ht 166.4 cm (65.51\")   Wt 70.3 kg (155 lb)   BMI 25.39 kg/m²   Estimated body mass index is 25.39 kg/m² as calculated from the following:    Height as of this encounter: 166.4 cm (65.51\").    Weight as of this encounter: 70.3 kg (155 lb).  O2 difficulty to obtain, likely due to cold extremities but no signs of decreased perfusion.   O2 at 96% with HR at 67 while at rest.       Physical Exam  Vitals reviewed.   Constitutional:       General: She is not in acute distress.     Appearance: She is not diaphoretic.   HENT:      Head: Normocephalic and atraumatic.   Cardiovascular:      Rate and Rhythm: Normal rate and regular rhythm.   Pulmonary:      Effort: Pulmonary effort is normal.      Breath sounds: No wheezing, rhonchi or rales.   Neurological:      Mental Status: She is alert and oriented to person, place, and time.   Psychiatric:         Behavior: Behavior normal.        Result Review :  The following data was reviewed by: Veronica Petersen PA-C on 01/03/2025:       PFT 2024    Moderate obstructive lung disease with significant bronchodilator effect seen on this occasion.  Diffusion " capacity is normal.  Mildly reduced.  Lung volumes show significant air trapping.  Flow-volume loop is obstructive          -----------------------------------------------------------------------------------    CT chest imaging/report April 2024  Per personal review  - Image 64 - right lower 5 mm nodule (could so be scarring/atelectasis, covers images 63-66).   - Images 44-54 - Left lingular scarring/atelectasis    ----------------------------------------------------------------------------------    Alpha 1 genotype - MM      ----------------------------------------------------------------------------------        Assessment and Plan   Diagnoses and all orders for this visit:    1. Asthma-COPD overlap syndrome (Primary)    2. Pulmonary nodule    3. Smoker          Pulmonary nodule,   Cigarette dependence:  CT chest from April 2024 notable for   - Image 64 - right lower 5 mm nodule (possible scarring/atelectasis instead, coverages images 63-66)  - Images 44-54 - left lingular scarring/atelectasis  No prior imaging for comparison.   Remains a current smoker, 1 pack per day average with 60 years of use (60 pack years).   Has follow up CT scan on January 20th 2025.         COPD-asthma overlap::   Moderate obstruction with asthmatic overlap noted per past PFT  Continue albuterol inhaler as needed  Continue Spiriva 1.25 mcg 1 puffs daily use  Full dose of 2 puffs not as well tolerated  Also tried spiriva 2.5 mcg - not well tolerated, side effects noted.   Has also not tolerated Breztri in the past  Does best with minimal therapy.   Prefers to hold off on overnight pulse oximetry testing today.         Follow Up   Return in about 6 months (around 7/3/2025), or if symptoms worsen or fail to improve, for Next scheduled follow up.  Patient was given instructions and counseling regarding her condition or for health maintenance advice. Please see specific information pulled into the AVS if appropriate.

## 2025-01-20 ENCOUNTER — TELEPHONE (OUTPATIENT)
Dept: PULMONOLOGY | Facility: CLINIC | Age: 75
End: 2025-01-20
Payer: MEDICARE

## 2025-01-20 ENCOUNTER — HOSPITAL ENCOUNTER (OUTPATIENT)
Dept: CT IMAGING | Facility: HOSPITAL | Age: 75
Discharge: HOME | End: 2025-01-20
Admitting: INTERNAL MEDICINE
Payer: MEDICARE

## 2025-01-20 DIAGNOSIS — R91.1 PULMONARY NODULE: ICD-10-CM

## 2025-01-20 PROCEDURE — 71250 CT THORAX DX C-: CPT

## 2025-01-20 PROCEDURE — 71250 CT THORAX DX C-: CPT | Performed by: RADIOLOGY

## 2025-01-28 ENCOUNTER — DOCUMENTATION (OUTPATIENT)
Dept: PULMONOLOGY | Facility: CLINIC | Age: 75
End: 2025-01-28
Payer: MEDICARE

## 2025-01-28 NOTE — PROGRESS NOTES
Called and updated patient with CT scan results today.    Upon review  Persistent COPD changes noted  Persistent right lower scarring (does have a slight nodular appearance on each individual imaging, but consecutively makes a linear scarring appearance.  Nodular area measures about 3.8 mm, smaller than previous 5 mm)  Left scarring appears stable  No other new nodules  Thyroid findings discussed      Plan:  Stable scarring - can consider next LDCT screening in 1 year, closer to January 2025  Thyroid findings - already follows with endocrinology and had multinodular goiter noted on recent thyroid ultrasound.  Will notify endocrinology NP of results in case this provides any new information, otherwise she can continue with her same schedule follow-up with endocrinology in June    Next pulmonology follow-up in July

## 2025-02-03 ENCOUNTER — OFFICE VISIT (OUTPATIENT)
Dept: ENDOCRINOLOGY | Facility: CLINIC | Age: 75
End: 2025-02-03
Payer: MEDICARE

## 2025-02-03 VITALS
SYSTOLIC BLOOD PRESSURE: 140 MMHG | WEIGHT: 154 LBS | DIASTOLIC BLOOD PRESSURE: 73 MMHG | BODY MASS INDEX: 25.23 KG/M2 | OXYGEN SATURATION: 95 % | HEART RATE: 68 BPM

## 2025-02-03 DIAGNOSIS — E05.90 HYPERTHYROIDISM: ICD-10-CM

## 2025-02-03 DIAGNOSIS — E04.9 GOITER: Primary | ICD-10-CM

## 2025-02-03 PROCEDURE — 1160F RVW MEDS BY RX/DR IN RCRD: CPT | Performed by: NURSE PRACTITIONER

## 2025-02-03 PROCEDURE — 3077F SYST BP >= 140 MM HG: CPT | Performed by: NURSE PRACTITIONER

## 2025-02-03 PROCEDURE — 1159F MED LIST DOCD IN RCRD: CPT | Performed by: NURSE PRACTITIONER

## 2025-02-03 PROCEDURE — 99214 OFFICE O/P EST MOD 30 MIN: CPT | Performed by: NURSE PRACTITIONER

## 2025-02-03 PROCEDURE — 3078F DIAST BP <80 MM HG: CPT | Performed by: NURSE PRACTITIONER

## 2025-02-03 NOTE — ASSESSMENT & PLAN NOTE
-Clinically euthyroid.  -TFT's most recent within range.  Continue with Methimazole 5 mg QOD.  -Discussed the importance of taking medication regularly without missing doses.  -Follow-up in 3 months.

## 2025-02-03 NOTE — PROGRESS NOTES
Chief Complaint   Patient presents with    Follow-up     Hyperthyroid, CT showed growth behind sternum         Referring Provider  No ref. provider found     HPI   Christal Perez is a 74 y.o. female had concerns including Follow-up (Hyperthyroid, CT showed growth behind sternum ).   Hyperthyroidism.  Thyroid nodules.    Patient had a CT chest without contrast by pulmonology on 1/20/2025 that was noted to have a diffuse large multinodular goiter with substernal extension.  She is here to further discuss this is asking about thyroidectomy.  She reports that her mother had some form of lymph node issue prior to death and that within a 3-week time it had spread significantly.  She reports that this worries her and she is considering having her thyroid removed due to this.  CT chest: 01/20/2025  FINDINGS:    Lungs and pleural spaces:  Linear scarring is noted of the right lower lobe without discrete nodule or consolidative airspace disease identified.  Linear scarring or atelectasis lingula stable from previous.  No significant effusion.  No suspicious pulmonary nodules identified.    Heart:  Mild coronary artery calcifications, stable.  No cardiomegaly.  No significant pericardial effusion.    Thyroid:  Diffusely enlarged multinodular thyroid goiter with substernal extension.    Bones/joints:  Unremarkable.  No acute fracture.  No dislocation.    Soft tissues:  Unremarkable.    Vasculature:  Atherosclerosis thoracic aorta, stable.    Lymph nodes:  Unremarkable.  No thoracic adenopathy.    Gallbladder and bile ducts:  Cholecystectomy.    Kidneys and ureters:  Right and left renal cysts.  IMPRESSION:  1.  No suspicious pulmonary nodules identified.  2.  Linear scarring is noted of the right lower lobe without discrete nodule or consolidative airspace disease identified.  3.  Diffusely enlarged multinodular thyroid goiter with substernal extension.  4.  Linear scarring or atelectasis lingula stable from previous.    She is  currently taking Methimazole 5 mg QOD.  She is taking this regularly without missing doses.  She denies any changes to her neck or compressive s/sx's.  She is not currently taking any conflicting medication at this time. .     History:  Patient reports that she was seen by Ernesto and Ward Mcgregor in the past and was told that she had hyperthyroidism at that time.  They were not able to determine underlying cause at that time and patient has not been on any medication for hyperthyroidism.  She did have a negative uptake and scan at this time.  She reports that her levels regulated and did not require medication therapy.  She has been followed by PCP for some time.  She reports that recently she was noted to have abnormal TSH 0.10 L on 05/31/2024.  Reports that she has not had an ultrasound thyroid in some time.  She was sent here for further evaluation and treatment.    Birth state: [place]  Previous history of radiation to face/neck: none  Consuming foods high in iodine: none  Family history of thyroid complications: brother- hypothyroidism, no history of thyroid cancer.    The following portions of the patient's history were reviewed and updated as appropriate: allergies, current medications, past family history, past medical history, past social history, past surgical history, and problem list.  Past Medical History:   Diagnosis Date    Disease of thyroid gland     Hyperlipidemia     Hypertension      Past Surgical History:   Procedure Laterality Date    CATARACT EXTRACTION      CHOLECYSTECTOMY      CORNEAL TRANSPLANT      EYE SURGERY      HYSTERECTOMY        Family History   Problem Relation Age of Onset    Lymphoma Mother     Lung cancer Father     Lung cancer Brother     Breast cancer Neg Hx       Social History     Socioeconomic History    Marital status:    Tobacco Use    Smoking status: Heavy Smoker     Current packs/day: 1.00     Average packs/day: 1 pack/day for 60.1 years (60.1 ttl pk-yrs)     Types:  Cigarettes     Start date: 1965     Passive exposure: Current    Smokeless tobacco: Never   Vaping Use    Vaping status: Former    Substances: Nicotine, Flavoring    Devices: Disposable   Substance and Sexual Activity    Alcohol use: Not Currently    Drug use: Yes     Types: Hydromorphone    Sexual activity: Defer      Allergies   Allergen Reactions    Pneumococcal Vaccines Swelling     Swollen where vaccination was given, injection site got red, and itching.    Sulfa Antibiotics Nausea Only    Statins Other (See Comments)     Leg pain and heaviness in legs.      Current Outpatient Medications on File Prior to Visit   Medication Sig Dispense Refill    albuterol sulfate  (90 Base) MCG/ACT inhaler Inhalation for 17 Days      lisinopril (PRINIVIL,ZESTRIL) 20 MG tablet Take 1 tablet by mouth.      methIMAzole (TAPAZOLE) 5 MG tablet Take 1 tablet by mouth Every Other Day. 45 tablet 1    prednisoLONE acetate (PRED FORTE) 1 % ophthalmic suspension INSTILL 1 DROP TO THE RIGHT EYE EVERY DAY  6    Tiotropium Bromide Monohydrate (Spiriva Respimat) 1.25 MCG/ACT aerosol solution inhaler Inhale 2 puffs Daily. 4 g 8     No current facility-administered medications on file prior to visit.      Review of Systems   Constitutional:  Positive for fatigue. Negative for unexpected weight gain and unexpected weight loss.   Eyes:         Cataract surgery, cornea transplant   Respiratory:  Positive for shortness of breath.    Cardiovascular:  Negative for palpitations.   Endocrine: Negative for cold intolerance and heat intolerance.   Neurological:  Positive for tremors.   Psychiatric/Behavioral:  Positive for sleep disturbance. Negative for agitation. The patient is not nervous/anxious.    All other systems reviewed and are negative.    /73 (BP Location: Right arm, Patient Position: Sitting, Cuff Size: Adult)   Pulse 68   Wt 69.9 kg (154 lb)   SpO2 95%   BMI 25.23 kg/m²      Physical Exam  Vitals reviewed.    Constitutional:       Appearance: Normal appearance.   Eyes:      Extraocular Movements: Extraocular movements intact.   Neck:      Thyroid: Thyroid mass, thyromegaly and thyroid tenderness present.   Cardiovascular:      Rate and Rhythm: Normal rate.   Pulmonary:      Effort: Pulmonary effort is normal.   Skin:     General: Skin is warm.   Neurological:      General: No focal deficit present.      Mental Status: She is alert and oriented to person, place, and time.      Comments: Mild tremor noted.   Psychiatric:         Mood and Affect: Mood normal.         Behavior: Behavior normal.         Thought Content: Thought content normal.         Judgment: Judgment normal.       Labs/Imaging  CMP  Lab Results   Component Value Date    GLUCOSE 99 12/04/2024    BUN 9 12/04/2024    CREATININE 0.79 12/04/2024    EGFRIFNONA 68 11/15/2018    BCR 11.4 12/04/2024    K 4.2 12/04/2024    CO2 27.4 12/04/2024    CALCIUM 9.6 12/04/2024    ALBUMIN 4.0 12/04/2024    AST 19 12/04/2024    ALT 14 12/04/2024        CBC w/DIFF   Lab Results   Component Value Date    WBC 7.01 12/04/2024    RBC 5.39 (H) 12/04/2024    HGB 16.1 (H) 12/04/2024    HCT 49.6 (H) 12/04/2024    MCV 92.0 12/04/2024    MCH 29.9 12/04/2024    MCHC 32.5 12/04/2024    RDW 12.0 (L) 12/04/2024    RDWSD 40.3 12/04/2024    MPV 12.5 (H) 12/04/2024     12/04/2024    NEUTRORELPCT 64.1 12/04/2024    LYMPHORELPCT 20.8 12/04/2024    MONORELPCT 10.6 12/04/2024    EOSRELPCT 3.1 12/04/2024    BASORELPCT 1.1 12/04/2024    AUTOIGPER 0.3 12/04/2024    NEUTROABS 4.49 12/04/2024    LYMPHSABS 1.46 12/04/2024    MONOSABS 0.74 12/04/2024    EOSABS 0.22 12/04/2024    BASOSABS 0.08 12/04/2024    AUTOIGNUM 0.02 12/04/2024    NRBC 0.0 12/04/2024       TSH  Lab Results   Component Value Date    TSH 0.588 12/04/2024    TSH 0.123 (L) 07/29/2024    TSH 0.188 (L) 01/05/2016       T4  Lab Results   Component Value Date    FREET4 1.43 12/04/2024    FREET4 1.43 07/29/2024    FREET4 1.17  "01/05/2016     No results found for: \"P6CODQO\"    T3  Lab Results   Component Value Date    T3FREE 3.05 12/04/2024    T3FREE 3.03 07/29/2024     No results found for: \"V9PSAJD\"    TRAb  Lab Results   Component Value Date    TSURCPAB <1.10 07/29/2024       TPO  Lab Results   Component Value Date    THYROIDAB <9 07/29/2024       No valid procedures specified.    Assessment and Plan    Diagnoses and all orders for this visit:    1. Goiter (Primary)  Assessment & Plan:  Discussed findings of ultrasound from last year and most recent CT scan with patient.  Patient reports that she does have some increased shortness of breath and difficulty breathing when she turns her head certain ways and lifts her arms above her head.  She would like to have surgical evaluation to determine if she can have surgical excision of the thyroid.  Discussed this with her and will place referral to see Dr. Calvert in [place] for this.    Orders:  -     Ambulatory Referral to General Surgery    2. Hyperthyroidism  Assessment & Plan:  -Clinically euthyroid.  -TFT's most recent within range.  Continue with Methimazole 5 mg QOD.  -Discussed the importance of taking medication regularly without missing doses.  -Follow-up in 3 months.    Orders:  -     Ambulatory Referral to General Surgery             Return in about 3 months (around 5/3/2025) for Follow-up appointment. The patient was instructed to contact the clinic with any interval questions or concerns.      This document has been electronically signed by DAVID Garcia  February 3, 2025 11:41 EST   Endocrinology    Please note that portions of this document were completed with a voice recognition program. Efforts were made to edit the dictations, but occasionally words are mis-transcribed.   "

## 2025-02-03 NOTE — ASSESSMENT & PLAN NOTE
Discussed findings of ultrasound from last year and most recent CT scan with patient.  Patient reports that she does have some increased shortness of breath and difficulty breathing when she turns her head certain ways and lifts her arms above her head.  She would like to have surgical evaluation to determine if she can have surgical excision of the thyroid.  Discussed this with her and will place referral to see Dr. Calvert in [place] for this.

## 2025-04-22 ENCOUNTER — TRANSCRIBE ORDERS (OUTPATIENT)
Dept: ADMINISTRATIVE | Facility: HOSPITAL | Age: 75
End: 2025-04-22
Payer: MEDICARE

## 2025-04-22 DIAGNOSIS — E05.20 THYROTOXICOSIS WITH TOXIC MULTINODULAR GOITER WITHOUT THYROTOXIC CRISIS OR STORM: Primary | ICD-10-CM

## 2025-05-06 RX ORDER — METHIMAZOLE 5 MG/1
5 TABLET ORAL EVERY OTHER DAY
Qty: 45 TABLET | Refills: 1 | Status: SHIPPED | OUTPATIENT
Start: 2025-05-06 | End: 2026-05-06

## 2025-05-18 ENCOUNTER — HOSPITAL ENCOUNTER (OUTPATIENT)
Dept: CT IMAGING | Facility: HOSPITAL | Age: 75
Discharge: HOME | End: 2025-05-18
Admitting: SURGERY
Payer: MEDICARE

## 2025-05-18 DIAGNOSIS — E05.20 THYROTOXICOSIS WITH TOXIC MULTINODULAR GOITER WITHOUT THYROTOXIC CRISIS OR STORM: ICD-10-CM

## 2025-05-18 LAB — CREAT BLDA-MCNC: 1 MG/DL (ref 0.6–1.3)

## 2025-05-18 PROCEDURE — 70491 CT SOFT TISSUE NECK W/DYE: CPT | Performed by: RADIOLOGY

## 2025-05-18 PROCEDURE — 70491 CT SOFT TISSUE NECK W/DYE: CPT

## 2025-05-18 PROCEDURE — 82565 ASSAY OF CREATININE: CPT

## 2025-05-18 PROCEDURE — 25510000001 IOPAMIDOL 61 % SOLUTION: Performed by: SURGERY

## 2025-05-18 RX ORDER — IOPAMIDOL 612 MG/ML
100 INJECTION, SOLUTION INTRAVASCULAR
Status: COMPLETED | OUTPATIENT
Start: 2025-05-18 | End: 2025-05-18

## 2025-05-18 RX ADMIN — IOPAMIDOL 85 ML: 612 INJECTION, SOLUTION INTRAVENOUS at 15:36

## 2025-05-19 ENCOUNTER — TELEPHONE (OUTPATIENT)
Dept: PULMONOLOGY | Facility: CLINIC | Age: 75
End: 2025-05-19
Payer: MEDICARE

## 2025-05-19 DIAGNOSIS — J98.59 MEDIASTINAL MASS: Primary | ICD-10-CM

## 2025-05-20 NOTE — TELEPHONE ENCOUNTER
Contacted by Endocrinology on 5/19 concerning recent CT findings.   Thyroidectomy is on hold until new finding is evaluated.     Updated patient that will proceed with dedicated CT chest, then likely proceed with bronchoscopy if she is willing. She would like to do this.   Confirm with Dr. Watts, contrast is not needed at this time.   Ordered stat CT chest without contrast (stat)  After completed, will schedule bronchoscopy as soon as possible (either this week or week of June 2nd)

## 2025-05-28 ENCOUNTER — TELEPHONE (OUTPATIENT)
Dept: PULMONOLOGY | Facility: CLINIC | Age: 75
End: 2025-05-28
Payer: MEDICARE

## 2025-05-28 ENCOUNTER — HOSPITAL ENCOUNTER (OUTPATIENT)
Facility: HOSPITAL | Age: 75
Discharge: HOME | End: 2025-05-28
Admitting: PHYSICIAN ASSISTANT
Payer: MEDICARE

## 2025-05-28 DIAGNOSIS — J98.59 MEDIASTINAL MASS: ICD-10-CM

## 2025-05-28 DIAGNOSIS — J44.89 ASTHMA-COPD OVERLAP SYNDROME: ICD-10-CM

## 2025-05-28 DIAGNOSIS — R06.02 SOB (SHORTNESS OF BREATH): ICD-10-CM

## 2025-05-28 DIAGNOSIS — J44.1 COPD WITH ACUTE EXACERBATION: Primary | ICD-10-CM

## 2025-05-28 PROCEDURE — 71250 CT THORAX DX C-: CPT | Performed by: RADIOLOGY

## 2025-05-28 PROCEDURE — 71250 CT THORAX DX C-: CPT

## 2025-05-28 RX ORDER — PREDNISONE 20 MG/1
40 TABLET ORAL DAILY
Qty: 10 TABLET | Refills: 0 | Status: SHIPPED | OUTPATIENT
Start: 2025-05-28 | End: 2025-06-02

## 2025-05-28 RX ORDER — TIOTROPIUM BROMIDE INHALATION SPRAY 1.56 UG/1
2 SPRAY, METERED RESPIRATORY (INHALATION)
Qty: 4 G | Refills: 8 | Status: SHIPPED | OUTPATIENT
Start: 2025-05-28

## 2025-05-29 NOTE — TELEPHONE ENCOUNTER
Updated patient with CT results.      Per personal imaging review/report:   - image 84 - stable right lower nodular scarring/atelectasis? Still measuring 5.1 mm  - image 63-72 - left lower scarring stable.   - new mediastinal mass (approx. 3 cm)  - thyroid nodules noted    Patient also having increased shortness of breath from baseline. Having some occasional wheezing and coughing/rattling.   Will try 5 day oral prednisone for possible flareup.  Will see if antibiotic is needed per preop labs  Offered inhaler escalation - prefers to await for now  Ordered preop labs, she will likely do this on Friday due to transportation  Ordered bronchoscopy for next Wednesday, 6/4

## 2025-05-30 ENCOUNTER — LAB (OUTPATIENT)
Dept: LAB | Facility: HOSPITAL | Age: 75
End: 2025-05-30
Payer: MEDICARE

## 2025-05-30 DIAGNOSIS — J98.59 MEDIASTINAL MASS: ICD-10-CM

## 2025-05-30 DIAGNOSIS — R06.02 SOB (SHORTNESS OF BREATH): ICD-10-CM

## 2025-05-30 LAB
ANION GAP SERPL CALCULATED.3IONS-SCNC: 10 MMOL/L (ref 5–15)
APTT PPP: 30.6 SECONDS (ref 24.5–35.9)
BASOPHILS # BLD AUTO: 0.08 10*3/MM3 (ref 0–0.2)
BASOPHILS NFR BLD AUTO: 1 % (ref 0–1.5)
BUN SERPL-MCNC: 10.2 MG/DL (ref 8–23)
BUN/CREAT SERPL: 14.2 (ref 7–25)
CALCIUM SPEC-SCNC: 9 MG/DL (ref 8.6–10.5)
CHLORIDE SERPL-SCNC: 99 MMOL/L (ref 98–107)
CO2 SERPL-SCNC: 26 MMOL/L (ref 22–29)
CREAT SERPL-MCNC: 0.72 MG/DL (ref 0.57–1)
DEPRECATED RDW RBC AUTO: 44.3 FL (ref 37–54)
EGFRCR SERPLBLD CKD-EPI 2021: 87.3 ML/MIN/1.73
EOSINOPHIL # BLD AUTO: 0.12 10*3/MM3 (ref 0–0.4)
EOSINOPHIL NFR BLD AUTO: 1.5 % (ref 0.3–6.2)
ERYTHROCYTE [DISTWIDTH] IN BLOOD BY AUTOMATED COUNT: 12.9 % (ref 12.3–15.4)
GLUCOSE SERPL-MCNC: 88 MG/DL (ref 65–99)
HCT VFR BLD AUTO: 49 % (ref 34–46.6)
HGB BLD-MCNC: 15.6 G/DL (ref 12–15.9)
IMM GRANULOCYTES # BLD AUTO: 0.02 10*3/MM3 (ref 0–0.05)
IMM GRANULOCYTES NFR BLD AUTO: 0.2 % (ref 0–0.5)
INR PPP: 0.9 (ref 0.9–1.1)
LYMPHOCYTES # BLD AUTO: 1.21 10*3/MM3 (ref 0.7–3.1)
LYMPHOCYTES NFR BLD AUTO: 15 % (ref 19.6–45.3)
MCH RBC QN AUTO: 29.8 PG (ref 26.6–33)
MCHC RBC AUTO-ENTMCNC: 31.8 G/DL (ref 31.5–35.7)
MCV RBC AUTO: 93.7 FL (ref 79–97)
MONOCYTES # BLD AUTO: 0.58 10*3/MM3 (ref 0.1–0.9)
MONOCYTES NFR BLD AUTO: 7.2 % (ref 5–12)
NEUTROPHILS NFR BLD AUTO: 6.05 10*3/MM3 (ref 1.7–7)
NEUTROPHILS NFR BLD AUTO: 75.1 % (ref 42.7–76)
NRBC BLD AUTO-RTO: 0 /100 WBC (ref 0–0.2)
PLATELET # BLD AUTO: 272 10*3/MM3 (ref 140–450)
PMV BLD AUTO: 11.9 FL (ref 6–12)
POTASSIUM SERPL-SCNC: 3.9 MMOL/L (ref 3.5–5.2)
PROTHROMBIN TIME: 12.7 SECONDS (ref 12.5–15.2)
RBC # BLD AUTO: 5.23 10*6/MM3 (ref 3.77–5.28)
SODIUM SERPL-SCNC: 135 MMOL/L (ref 136–145)
WBC NRBC COR # BLD AUTO: 8.06 10*3/MM3 (ref 3.4–10.8)

## 2025-05-30 PROCEDURE — 36415 COLL VENOUS BLD VENIPUNCTURE: CPT

## 2025-05-30 PROCEDURE — 85730 THROMBOPLASTIN TIME PARTIAL: CPT

## 2025-05-30 PROCEDURE — 80048 BASIC METABOLIC PNL TOTAL CA: CPT

## 2025-05-30 PROCEDURE — 85025 COMPLETE CBC W/AUTO DIFF WBC: CPT

## 2025-05-30 PROCEDURE — 85610 PROTHROMBIN TIME: CPT

## 2025-06-04 ENCOUNTER — HOSPITAL ENCOUNTER (INPATIENT)
Facility: HOSPITAL | Age: 75
LOS: 1 days | Discharge: HOME | DRG: 177 | End: 2025-06-05
Attending: INTERNAL MEDICINE | Admitting: STUDENT IN AN ORGANIZED HEALTH CARE EDUCATION/TRAINING PROGRAM
Payer: MEDICARE

## 2025-06-04 ENCOUNTER — APPOINTMENT (OUTPATIENT)
Dept: GENERAL RADIOLOGY | Facility: HOSPITAL | Age: 75
DRG: 177 | End: 2025-06-04
Payer: MEDICARE

## 2025-06-04 ENCOUNTER — ANESTHESIA (OUTPATIENT)
Dept: PERIOP | Facility: HOSPITAL | Age: 75
End: 2025-06-04
Payer: MEDICARE

## 2025-06-04 ENCOUNTER — ANESTHESIA EVENT (OUTPATIENT)
Dept: PERIOP | Facility: HOSPITAL | Age: 75
End: 2025-06-04
Payer: MEDICARE

## 2025-06-04 DIAGNOSIS — J44.89 ASTHMA-COPD OVERLAP SYNDROME: ICD-10-CM

## 2025-06-04 DIAGNOSIS — J44.1 COPD WITH ACUTE EXACERBATION: Primary | ICD-10-CM

## 2025-06-04 DIAGNOSIS — J98.59 MEDIASTINAL MASS: ICD-10-CM

## 2025-06-04 PROBLEM — J96.01 ACUTE HYPOXIC RESPIRATORY FAILURE: Status: ACTIVE | Noted: 2025-06-04

## 2025-06-04 LAB
ANION GAP SERPL CALCULATED.3IONS-SCNC: 14 MMOL/L (ref 5–15)
BASOPHILS # BLD AUTO: 0.04 10*3/MM3 (ref 0–0.2)
BASOPHILS NFR BLD AUTO: 0.2 % (ref 0–1.5)
BUN SERPL-MCNC: 12.2 MG/DL (ref 8–23)
BUN/CREAT SERPL: 13.7 (ref 7–25)
CALCIUM SPEC-SCNC: 8.7 MG/DL (ref 8.6–10.5)
CHLORIDE SERPL-SCNC: 96 MMOL/L (ref 98–107)
CO2 SERPL-SCNC: 26 MMOL/L (ref 22–29)
CREAT SERPL-MCNC: 0.89 MG/DL (ref 0.57–1)
DEPRECATED RDW RBC AUTO: 46.9 FL (ref 37–54)
EGFRCR SERPLBLD CKD-EPI 2021: 67.7 ML/MIN/1.73
EOSINOPHIL # BLD AUTO: 0.01 10*3/MM3 (ref 0–0.4)
EOSINOPHIL NFR BLD AUTO: 0.1 % (ref 0.3–6.2)
ERYTHROCYTE [DISTWIDTH] IN BLOOD BY AUTOMATED COUNT: 13.3 % (ref 12.3–15.4)
GLUCOSE SERPL-MCNC: 154 MG/DL (ref 65–99)
HCT VFR BLD AUTO: 50.2 % (ref 34–46.6)
HGB BLD-MCNC: 15.7 G/DL (ref 12–15.9)
IMM GRANULOCYTES # BLD AUTO: 0.12 10*3/MM3 (ref 0–0.05)
IMM GRANULOCYTES NFR BLD AUTO: 0.7 % (ref 0–0.5)
LYMPHOCYTES # BLD AUTO: 0.82 10*3/MM3 (ref 0.7–3.1)
LYMPHOCYTES NFR BLD AUTO: 4.7 % (ref 19.6–45.3)
MCH RBC QN AUTO: 29.6 PG (ref 26.6–33)
MCHC RBC AUTO-ENTMCNC: 31.3 G/DL (ref 31.5–35.7)
MCV RBC AUTO: 94.7 FL (ref 79–97)
MONOCYTES # BLD AUTO: 1.12 10*3/MM3 (ref 0.1–0.9)
MONOCYTES NFR BLD AUTO: 6.4 % (ref 5–12)
NEUTROPHILS NFR BLD AUTO: 15.49 10*3/MM3 (ref 1.7–7)
NEUTROPHILS NFR BLD AUTO: 87.9 % (ref 42.7–76)
NRBC BLD AUTO-RTO: 0 /100 WBC (ref 0–0.2)
PLATELET # BLD AUTO: 263 10*3/MM3 (ref 140–450)
PMV BLD AUTO: 10.8 FL (ref 6–12)
POTASSIUM SERPL-SCNC: 3.5 MMOL/L (ref 3.5–5.2)
RBC # BLD AUTO: 5.3 10*6/MM3 (ref 3.77–5.28)
SODIUM SERPL-SCNC: 136 MMOL/L (ref 136–145)
WBC NRBC COR # BLD AUTO: 17.6 10*3/MM3 (ref 3.4–10.8)

## 2025-06-04 PROCEDURE — 25010000002 METHYLPREDNISOLONE PER 125 MG: Performed by: INTERNAL MEDICINE

## 2025-06-04 PROCEDURE — 25010000002 NEOSTIGMINE 10 MG/10ML SOLUTION: Performed by: NURSE ANESTHETIST, CERTIFIED REGISTERED

## 2025-06-04 PROCEDURE — 85025 COMPLETE CBC W/AUTO DIFF WBC: CPT | Performed by: STUDENT IN AN ORGANIZED HEALTH CARE EDUCATION/TRAINING PROGRAM

## 2025-06-04 PROCEDURE — 94799 UNLISTED PULMONARY SVC/PX: CPT

## 2025-06-04 PROCEDURE — 0BDB8ZX EXTRACTION OF LEFT LOWER LOBE BRONCHUS, VIA NATURAL OR ARTIFICIAL OPENING ENDOSCOPIC, DIAGNOSTIC: ICD-10-PCS | Performed by: INTERNAL MEDICINE

## 2025-06-04 PROCEDURE — 0B9H8ZX DRAINAGE OF LUNG LINGULA, VIA NATURAL OR ARTIFICIAL OPENING ENDOSCOPIC, DIAGNOSTIC: ICD-10-PCS | Performed by: INTERNAL MEDICINE

## 2025-06-04 PROCEDURE — 25010000002 FENTANYL CITRATE (PF) 50 MCG/ML SOLUTION: Performed by: NURSE ANESTHETIST, CERTIFIED REGISTERED

## 2025-06-04 PROCEDURE — 0B9J8ZX DRAINAGE OF LEFT LOWER LUNG LOBE, VIA NATURAL OR ARTIFICIAL OPENING ENDOSCOPIC, DIAGNOSTIC: ICD-10-PCS | Performed by: INTERNAL MEDICINE

## 2025-06-04 PROCEDURE — 71045 X-RAY EXAM CHEST 1 VIEW: CPT

## 2025-06-04 PROCEDURE — 87116 MYCOBACTERIA CULTURE: CPT | Performed by: INTERNAL MEDICINE

## 2025-06-04 PROCEDURE — 87206 SMEAR FLUORESCENT/ACID STAI: CPT | Performed by: INTERNAL MEDICINE

## 2025-06-04 PROCEDURE — 87070 CULTURE OTHR SPECIMN AEROBIC: CPT | Performed by: INTERNAL MEDICINE

## 2025-06-04 PROCEDURE — 5A09357 ASSISTANCE WITH RESPIRATORY VENTILATION, LESS THAN 24 CONSECUTIVE HOURS, CONTINUOUS POSITIVE AIRWAY PRESSURE: ICD-10-PCS | Performed by: INTERNAL MEDICINE

## 2025-06-04 PROCEDURE — 99223 1ST HOSP IP/OBS HIGH 75: CPT

## 2025-06-04 PROCEDURE — 25010000002 GLYCOPYRROLATE 0.4 MG/2ML SOLUTION: Performed by: NURSE ANESTHETIST, CERTIFIED REGISTERED

## 2025-06-04 PROCEDURE — 94761 N-INVAS EAR/PLS OXIMETRY MLT: CPT

## 2025-06-04 PROCEDURE — 87205 SMEAR GRAM STAIN: CPT | Performed by: INTERNAL MEDICINE

## 2025-06-04 PROCEDURE — 25810000003 LACTATED RINGERS PER 1000 ML: Performed by: ANESTHESIOLOGY

## 2025-06-04 PROCEDURE — 25010000002 PROPOFOL 200 MG/20ML EMULSION: Performed by: NURSE ANESTHETIST, CERTIFIED REGISTERED

## 2025-06-04 PROCEDURE — 99221 1ST HOSP IP/OBS SF/LOW 40: CPT | Performed by: INTERNAL MEDICINE

## 2025-06-04 PROCEDURE — 76000 FLUOROSCOPY <1 HR PHYS/QHP: CPT

## 2025-06-04 PROCEDURE — 25010000002 LIDOCAINE HCL (PF) 4 % SOLUTION: Performed by: INTERNAL MEDICINE

## 2025-06-04 PROCEDURE — 07D78ZX EXTRACTION OF THORAX LYMPHATIC, VIA NATURAL OR ARTIFICIAL OPENING ENDOSCOPIC, DIAGNOSTIC: ICD-10-PCS | Performed by: INTERNAL MEDICINE

## 2025-06-04 PROCEDURE — 71045 X-RAY EXAM CHEST 1 VIEW: CPT | Performed by: RADIOLOGY

## 2025-06-04 PROCEDURE — 25010000002 ONDANSETRON PER 1 MG: Performed by: NURSE ANESTHETIST, CERTIFIED REGISTERED

## 2025-06-04 PROCEDURE — 25010000002 LIDOCAINE 1% - EPINEPHRINE 1:100000 1 %-1:100000 SOLUTION: Performed by: INTERNAL MEDICINE

## 2025-06-04 PROCEDURE — 0BDJ8ZX EXTRACTION OF LEFT LOWER LUNG LOBE, VIA NATURAL OR ARTIFICIAL OPENING ENDOSCOPIC, DIAGNOSTIC: ICD-10-PCS | Performed by: INTERNAL MEDICINE

## 2025-06-04 PROCEDURE — 94664 DEMO&/EVAL PT USE INHALER: CPT

## 2025-06-04 PROCEDURE — 80048 BASIC METABOLIC PNL TOTAL CA: CPT | Performed by: STUDENT IN AN ORGANIZED HEALTH CARE EDUCATION/TRAINING PROGRAM

## 2025-06-04 PROCEDURE — 25010000002 FUROSEMIDE PER 20 MG: Performed by: INTERNAL MEDICINE

## 2025-06-04 PROCEDURE — 25010000002 FAMOTIDINE (PF) 20 MG/2ML SOLUTION: Performed by: NURSE ANESTHETIST, CERTIFIED REGISTERED

## 2025-06-04 PROCEDURE — 31624 DX BRONCHOSCOPE/LAVAGE: CPT | Performed by: INTERNAL MEDICINE

## 2025-06-04 PROCEDURE — 94640 AIRWAY INHALATION TREATMENT: CPT

## 2025-06-04 PROCEDURE — 31652 BRONCH EBUS SAMPLNG 1/2 NODE: CPT | Performed by: INTERNAL MEDICINE

## 2025-06-04 PROCEDURE — 94660 CPAP INITIATION&MGMT: CPT

## 2025-06-04 PROCEDURE — 87071 CULTURE AEROBIC QUANT OTHER: CPT | Performed by: INTERNAL MEDICINE

## 2025-06-04 PROCEDURE — 87102 FUNGUS ISOLATION CULTURE: CPT | Performed by: INTERNAL MEDICINE

## 2025-06-04 RX ORDER — ROCURONIUM BROMIDE 10 MG/ML
INJECTION, SOLUTION INTRAVENOUS AS NEEDED
Status: DISCONTINUED | OUTPATIENT
Start: 2025-06-04 | End: 2025-06-04 | Stop reason: SURG

## 2025-06-04 RX ORDER — SODIUM CHLORIDE 9 MG/ML
40 INJECTION, SOLUTION INTRAVENOUS AS NEEDED
Status: DISCONTINUED | OUTPATIENT
Start: 2025-06-04 | End: 2025-06-04 | Stop reason: HOSPADM

## 2025-06-04 RX ORDER — LIDOCAINE HYDROCHLORIDE 40 MG/ML
3 INJECTION, SOLUTION RETROBULBAR ONCE
Status: COMPLETED | OUTPATIENT
Start: 2025-06-04 | End: 2025-06-04

## 2025-06-04 RX ORDER — SODIUM CHLORIDE 0.9 % (FLUSH) 0.9 %
10 SYRINGE (ML) INJECTION EVERY 12 HOURS SCHEDULED
Status: DISCONTINUED | OUTPATIENT
Start: 2025-06-04 | End: 2025-06-05 | Stop reason: HOSPADM

## 2025-06-04 RX ORDER — LIDOCAINE HYDROCHLORIDE AND EPINEPHRINE 10; 10 MG/ML; UG/ML
INJECTION, SOLUTION INFILTRATION; PERINEURAL AS NEEDED
Status: DISCONTINUED | OUTPATIENT
Start: 2025-06-04 | End: 2025-06-04 | Stop reason: HOSPADM

## 2025-06-04 RX ORDER — IPRATROPIUM BROMIDE AND ALBUTEROL SULFATE 2.5; .5 MG/3ML; MG/3ML
SOLUTION RESPIRATORY (INHALATION)
Status: COMPLETED
Start: 2025-06-04 | End: 2025-06-04

## 2025-06-04 RX ORDER — IPRATROPIUM BROMIDE AND ALBUTEROL SULFATE 2.5; .5 MG/3ML; MG/3ML
3 SOLUTION RESPIRATORY (INHALATION) ONCE
Status: COMPLETED | OUTPATIENT
Start: 2025-06-04 | End: 2025-06-04

## 2025-06-04 RX ORDER — BISACODYL 10 MG
10 SUPPOSITORY, RECTAL RECTAL DAILY PRN
Status: DISCONTINUED | OUTPATIENT
Start: 2025-06-04 | End: 2025-06-05 | Stop reason: HOSPADM

## 2025-06-04 RX ORDER — SODIUM CHLORIDE, SODIUM LACTATE, POTASSIUM CHLORIDE, CALCIUM CHLORIDE 600; 310; 30; 20 MG/100ML; MG/100ML; MG/100ML; MG/100ML
125 INJECTION, SOLUTION INTRAVENOUS ONCE
Status: COMPLETED | OUTPATIENT
Start: 2025-06-04 | End: 2025-06-04

## 2025-06-04 RX ORDER — MIDAZOLAM HYDROCHLORIDE 1 MG/ML
0.5 INJECTION, SOLUTION INTRAMUSCULAR; INTRAVENOUS
Status: DISCONTINUED | OUTPATIENT
Start: 2025-06-04 | End: 2025-06-04 | Stop reason: HOSPADM

## 2025-06-04 RX ORDER — NITROGLYCERIN 0.4 MG/1
0.4 TABLET SUBLINGUAL
Status: DISCONTINUED | OUTPATIENT
Start: 2025-06-04 | End: 2025-06-05 | Stop reason: HOSPADM

## 2025-06-04 RX ORDER — POLYETHYLENE GLYCOL 3350 17 G/17G
17 POWDER, FOR SOLUTION ORAL DAILY PRN
Status: DISCONTINUED | OUTPATIENT
Start: 2025-06-04 | End: 2025-06-05 | Stop reason: HOSPADM

## 2025-06-04 RX ORDER — ONDANSETRON 2 MG/ML
4 INJECTION INTRAMUSCULAR; INTRAVENOUS AS NEEDED
Status: DISCONTINUED | OUTPATIENT
Start: 2025-06-04 | End: 2025-06-04

## 2025-06-04 RX ORDER — SODIUM CHLORIDE 9 MG/ML
INJECTION, SOLUTION INTRAVENOUS AS NEEDED
Status: DISCONTINUED | OUTPATIENT
Start: 2025-06-04 | End: 2025-06-04 | Stop reason: HOSPADM

## 2025-06-04 RX ORDER — PREDNISOLONE ACETATE 10 MG/ML
1 SUSPENSION/ DROPS OPHTHALMIC DAILY
Status: DISCONTINUED | OUTPATIENT
Start: 2025-06-05 | End: 2025-06-05

## 2025-06-04 RX ORDER — SODIUM CHLORIDE 0.9 % (FLUSH) 0.9 %
10 SYRINGE (ML) INJECTION EVERY 12 HOURS SCHEDULED
Status: DISCONTINUED | OUTPATIENT
Start: 2025-06-04 | End: 2025-06-04 | Stop reason: HOSPADM

## 2025-06-04 RX ORDER — LISINOPRIL 10 MG/1
20 TABLET ORAL DAILY
Status: CANCELLED | OUTPATIENT
Start: 2025-06-04

## 2025-06-04 RX ORDER — SODIUM CHLORIDE 0.9 % (FLUSH) 0.9 %
10 SYRINGE (ML) INJECTION AS NEEDED
Status: DISCONTINUED | OUTPATIENT
Start: 2025-06-04 | End: 2025-06-04 | Stop reason: HOSPADM

## 2025-06-04 RX ORDER — ALBUTEROL SULFATE 0.83 MG/ML
2.5 SOLUTION RESPIRATORY (INHALATION) ONCE
Status: COMPLETED | OUTPATIENT
Start: 2025-06-04 | End: 2025-06-04

## 2025-06-04 RX ORDER — METHIMAZOLE 10 MG/1
5 TABLET ORAL EVERY OTHER DAY
Status: CANCELLED | OUTPATIENT
Start: 2025-06-05

## 2025-06-04 RX ORDER — SODIUM CHLORIDE, SODIUM LACTATE, POTASSIUM CHLORIDE, CALCIUM CHLORIDE 600; 310; 30; 20 MG/100ML; MG/100ML; MG/100ML; MG/100ML
100 INJECTION, SOLUTION INTRAVENOUS ONCE AS NEEDED
Status: DISCONTINUED | OUTPATIENT
Start: 2025-06-04 | End: 2025-06-04

## 2025-06-04 RX ORDER — FENTANYL CITRATE 50 UG/ML
INJECTION, SOLUTION INTRAMUSCULAR; INTRAVENOUS AS NEEDED
Status: DISCONTINUED | OUTPATIENT
Start: 2025-06-04 | End: 2025-06-04 | Stop reason: SURG

## 2025-06-04 RX ORDER — PREDNISOLONE ACETATE 10 MG/ML
1 SUSPENSION/ DROPS OPHTHALMIC DAILY
Status: CANCELLED | OUTPATIENT
Start: 2025-06-04

## 2025-06-04 RX ORDER — FUROSEMIDE 10 MG/ML
40 INJECTION INTRAMUSCULAR; INTRAVENOUS ONCE
Status: COMPLETED | OUTPATIENT
Start: 2025-06-04 | End: 2025-06-04

## 2025-06-04 RX ORDER — FAMOTIDINE 10 MG/ML
INJECTION, SOLUTION INTRAVENOUS AS NEEDED
Status: DISCONTINUED | OUTPATIENT
Start: 2025-06-04 | End: 2025-06-04 | Stop reason: SURG

## 2025-06-04 RX ORDER — BISACODYL 5 MG/1
5 TABLET, DELAYED RELEASE ORAL DAILY PRN
Status: DISCONTINUED | OUTPATIENT
Start: 2025-06-04 | End: 2025-06-05 | Stop reason: HOSPADM

## 2025-06-04 RX ORDER — ALBUTEROL SULFATE 0.83 MG/ML
2.5 SOLUTION RESPIRATORY (INHALATION) EVERY 6 HOURS PRN
Status: DISCONTINUED | OUTPATIENT
Start: 2025-06-04 | End: 2025-06-05 | Stop reason: HOSPADM

## 2025-06-04 RX ORDER — NEOSTIGMINE METHYLSULFATE 1 MG/ML
INJECTION INTRAVENOUS AS NEEDED
Status: DISCONTINUED | OUTPATIENT
Start: 2025-06-04 | End: 2025-06-04 | Stop reason: SURG

## 2025-06-04 RX ORDER — IPRATROPIUM BROMIDE AND ALBUTEROL SULFATE 2.5; .5 MG/3ML; MG/3ML
3 SOLUTION RESPIRATORY (INHALATION) ONCE AS NEEDED
Status: COMPLETED | OUTPATIENT
Start: 2025-06-04 | End: 2025-06-04

## 2025-06-04 RX ORDER — BACLOFEN 10 MG/1
10 TABLET ORAL ONCE
Status: COMPLETED | OUTPATIENT
Start: 2025-06-04 | End: 2025-06-04

## 2025-06-04 RX ORDER — OXYCODONE AND ACETAMINOPHEN 5; 325 MG/1; MG/1
1 TABLET ORAL ONCE AS NEEDED
Status: DISCONTINUED | OUTPATIENT
Start: 2025-06-04 | End: 2025-06-04

## 2025-06-04 RX ORDER — SODIUM CHLORIDE 9 MG/ML
40 INJECTION, SOLUTION INTRAVENOUS AS NEEDED
Status: DISCONTINUED | OUTPATIENT
Start: 2025-06-04 | End: 2025-06-05 | Stop reason: HOSPADM

## 2025-06-04 RX ORDER — SODIUM CHLORIDE 0.9 % (FLUSH) 0.9 %
10 SYRINGE (ML) INJECTION AS NEEDED
Status: DISCONTINUED | OUTPATIENT
Start: 2025-06-04 | End: 2025-06-05 | Stop reason: HOSPADM

## 2025-06-04 RX ORDER — GLYCOPYRROLATE 0.2 MG/ML
INJECTION INTRAMUSCULAR; INTRAVENOUS AS NEEDED
Status: DISCONTINUED | OUTPATIENT
Start: 2025-06-04 | End: 2025-06-04 | Stop reason: SURG

## 2025-06-04 RX ORDER — ONDANSETRON 2 MG/ML
INJECTION INTRAMUSCULAR; INTRAVENOUS AS NEEDED
Status: DISCONTINUED | OUTPATIENT
Start: 2025-06-04 | End: 2025-06-04 | Stop reason: SURG

## 2025-06-04 RX ORDER — IPRATROPIUM BROMIDE AND ALBUTEROL SULFATE 2.5; .5 MG/3ML; MG/3ML
3 SOLUTION RESPIRATORY (INHALATION)
Status: DISCONTINUED | OUTPATIENT
Start: 2025-06-04 | End: 2025-06-05 | Stop reason: HOSPADM

## 2025-06-04 RX ORDER — FENTANYL CITRATE 50 UG/ML
50 INJECTION, SOLUTION INTRAMUSCULAR; INTRAVENOUS
Status: DISCONTINUED | OUTPATIENT
Start: 2025-06-04 | End: 2025-06-04

## 2025-06-04 RX ORDER — PROPOFOL 10 MG/ML
INJECTION, EMULSION INTRAVENOUS AS NEEDED
Status: DISCONTINUED | OUTPATIENT
Start: 2025-06-04 | End: 2025-06-04 | Stop reason: SURG

## 2025-06-04 RX ORDER — AMOXICILLIN 250 MG
2 CAPSULE ORAL 2 TIMES DAILY PRN
Status: DISCONTINUED | OUTPATIENT
Start: 2025-06-04 | End: 2025-06-05 | Stop reason: HOSPADM

## 2025-06-04 RX ORDER — TIOTROPIUM BROMIDE INHALATION SPRAY 1.56 UG/1
1 SPRAY, METERED RESPIRATORY (INHALATION) 2 TIMES DAILY
COMMUNITY

## 2025-06-04 RX ORDER — METHIMAZOLE 10 MG/1
5 TABLET ORAL EVERY OTHER DAY
Status: DISCONTINUED | OUTPATIENT
Start: 2025-06-05 | End: 2025-06-05 | Stop reason: HOSPADM

## 2025-06-04 RX ADMIN — METHYLPREDNISOLONE SODIUM SUCCINATE 125 MG: 125 INJECTION INTRAMUSCULAR; INTRAVENOUS at 11:58

## 2025-06-04 RX ADMIN — FENTANYL CITRATE 100 MCG: 50 INJECTION INTRAMUSCULAR; INTRAVENOUS at 10:37

## 2025-06-04 RX ADMIN — ALBUTEROL SULFATE 2.5 MG: 2.5 SOLUTION RESPIRATORY (INHALATION) at 09:48

## 2025-06-04 RX ADMIN — IPRATROPIUM BROMIDE AND ALBUTEROL SULFATE 3 ML: 2.5; .5 SOLUTION RESPIRATORY (INHALATION) at 11:52

## 2025-06-04 RX ADMIN — FAMOTIDINE 20 MG: 10 INJECTION, SOLUTION INTRAVENOUS at 10:37

## 2025-06-04 RX ADMIN — DESOGESTREL AND ETHINYL ESTRADIOL 3 MG: KIT at 11:29

## 2025-06-04 RX ADMIN — PROPOFOL 160 MG: 10 INJECTION, EMULSION INTRAVENOUS at 10:41

## 2025-06-04 RX ADMIN — SODIUM CHLORIDE, POTASSIUM CHLORIDE, SODIUM LACTATE AND CALCIUM CHLORIDE: 600; 310; 30; 20 INJECTION, SOLUTION INTRAVENOUS at 10:30

## 2025-06-04 RX ADMIN — LIDOCAINE HYDROCHLORIDE 3 ML: 40 INJECTION, SOLUTION RETROBULBAR; TOPICAL at 09:54

## 2025-06-04 RX ADMIN — IPRATROPIUM BROMIDE AND ALBUTEROL SULFATE 3 ML: 2.5; .5 SOLUTION RESPIRATORY (INHALATION) at 12:02

## 2025-06-04 RX ADMIN — BACLOFEN 10 MG: 10 TABLET ORAL at 21:10

## 2025-06-04 RX ADMIN — FUROSEMIDE 40 MG: 10 INJECTION, SOLUTION INTRAMUSCULAR; INTRAVENOUS at 12:03

## 2025-06-04 RX ADMIN — ROCURONIUM BROMIDE 30 MG: 10 SOLUTION INTRAVENOUS at 10:41

## 2025-06-04 RX ADMIN — GLYCOPYRROLATE 0.4 MG: 0.2 INJECTION INTRAMUSCULAR; INTRAVENOUS at 11:29

## 2025-06-04 RX ADMIN — IPRATROPIUM BROMIDE AND ALBUTEROL SULFATE 3 ML: .5; 2.5 SOLUTION RESPIRATORY (INHALATION) at 18:41

## 2025-06-04 RX ADMIN — ONDANSETRON 4 MG: 2 INJECTION INTRAMUSCULAR; INTRAVENOUS at 10:37

## 2025-06-04 RX ADMIN — Medication 10 ML: at 21:10

## 2025-06-04 NOTE — ANESTHESIA POSTPROCEDURE EVALUATION
Patient: Christal Perez    Procedure Summary       Date: 06/04/25 Room / Location: [facility] OR  /  [facility] OR    Anesthesia Start: 1037 Anesthesia Stop: 1139    Procedure: BRONCHOSCOPY WITH ENDOBRONCHIAL ULTRASOUND AND NAVIGATION (Bilateral: Bronchus) Diagnosis:       Mediastinal mass      (Mediastinal mass [J98.59])    Surgeons: Torres Watts MD Provider: Jeovanny Romo DO    Anesthesia Type: general ASA Status: 3            Anesthesia Type: general    Vitals  Vitals Value Taken Time   /67 06/04/25 14:23   Temp 97.3 °F (36.3 °C) 06/04/25 11:41   Pulse 67 06/04/25 14:23   Resp 20 06/04/25 14:23   SpO2 92 % 06/04/25 14:23           Post Anesthesia Care and Evaluation    Patient location during evaluation: PHASE II  Patient participation: complete - patient participated  Level of consciousness: awake and alert  Pain score: 1  Pain management: adequate    Airway patency: patent  Anesthetic complications: No anesthetic complications  PONV Status: controlled  Cardiovascular status: acceptable  Respiratory status: acceptable  Hydration status: acceptable

## 2025-06-04 NOTE — NURSING NOTE
Dr. Watts at bedside, ordered for pt to be placed on bipap machine, respiratory at bedside as well and received orders from Dr. Watts.

## 2025-06-04 NOTE — H&P
[facility] Medicine Services  History & Physical    Patient Identification:  Name:  Christal Perez  Age:  75 y.o.  Sex:  female  :  1950  MRN:  [id number]   Visit Number:  [id number]  Admit Date: 2025   Primary Care Physician:  Jose Coley APRN    Subjective     Chief complaint: respiratory failure    History of presenting illness:      Christal Perez is a 75 y.o. female who is being admitted for further management.  Patient presented for navigational bronchoscopy today to further evaluate known hilar mass.  Developed respiratory failure postprocedure.  She was seen and examined on 3 S. with her brother at the bedside.  She was sitting up in bed, awake and alert, in no acute distress.  O2 sats mid 90s on 5 L nasal cannula.  She reported was scheduled for thyroidectomy on 2025 and had CT scan recently to reevaluate preoperatively which identified enlarged lymph nodes/hilar mass.  She states has been progressively more short of breath for the past several months but nothing acute in the last few days.  She has a chronic cough which is not productive however she reports she feels that she would feel better if she could produce sputum.  She denies any fevers, has had a runny nose recently but no sick contacts.  She denied any chest pain, palpitations, leg swelling, orthopnea.  She does not wear supplemental O2 at baseline.  She has never been diagnosed with heart failure.    Past medical history is significant for mediastinal mass, HTN, hypothyroidism, COPD, hyperlipidemia, tobacco abuse    Most recent vital signs were heart rate 66, respirations 16, BP 98/57, SpO2 90% on 5 L nasal cannula.  Chest x-ray from today showed interstitial thickening with vascular congestion, mild cardiomegaly    Known medications received prior to my evaluation included albuterol, Lasix, DuoNeb, 125 mg Solu-Medrol.   Room location at the time of my evaluation was 309.      ---------------------------------------------------------------------------------------------------------------------   Review of Systems   Constitutional:  Negative for chills and fever.   HENT:  Positive for rhinorrhea. Negative for congestion.    Respiratory:  Positive for cough and shortness of breath.    Cardiovascular:  Negative for chest pain and leg swelling.   Gastrointestinal:  Negative for abdominal pain, diarrhea, nausea and vomiting.   Genitourinary:  Negative for difficulty urinating and dysuria.   Musculoskeletal:  Negative for arthralgias and myalgias.   Skin:  Negative for rash and wound.   Neurological:  Negative for dizziness and light-headedness.        ---------------------------------------------------------------------------------------------------------------------   Past Medical History:   Diagnosis Date    Asthma     COPD (chronic obstructive pulmonary disease)     Disease of thyroid gland     GOITER    Fuchs' corneal dystrophy of right eye     HAD CORNEAL TRANSPLANT    History of transfusion     Hyperlipidemia     Hypertension     PONV (postoperative nausea and vomiting)      Past Surgical History:   Procedure Laterality Date    CATARACT EXTRACTION      CHOLECYSTECTOMY      CORNEAL TRANSPLANT      EYE SURGERY      HYSTERECTOMY      ORIF WRIST FRACTURE Left      Family History   Problem Relation Age of Onset    Lymphoma Mother     Lung cancer Father     Lung cancer Brother     Breast cancer Neg Hx      Social History     Socioeconomic History    Marital status:    Tobacco Use    Smoking status: Heavy Smoker     Current packs/day: 1.00     Average packs/day: 1 pack/day for 60.4 years (60.4 ttl pk-yrs)     Types: Cigarettes     Start date: 1965     Passive exposure: Current    Smokeless tobacco: Never   Vaping Use    Vaping status: Former    Substances: Nicotine, Flavoring    Devices: Disposable   Substance and Sexual Activity    Alcohol use: Not Currently    Drug use: Yes      Types: Hydromorphone    Sexual activity: Defer     ---------------------------------------------------------------------------------------------------------------------   Allergies:  Pneumococcal vaccines, Sulfa antibiotics, and Statins  ---------------------------------------------------------------------------------------------------------------------   Home medications:    Medications below are reported home medications pulling from within the system; at this time, these medications have not been reconciled unless otherwise specified and are in the verification process for further verifcation as current home medications.  Medications Prior to Admission   Medication Sig Dispense Refill Last Dose/Taking    albuterol sulfate  (90 Base) MCG/ACT inhaler Inhalation for 17 Days   6/4/2025 at  7:00 AM    lisinopril (PRINIVIL,ZESTRIL) 20 MG tablet Take 1 tablet by mouth.   6/3/2025    methIMAzole (TAPAZOLE) 5 MG tablet Take 1 tablet by mouth Every Other Day. 45 tablet 1 6/3/2025    prednisoLONE acetate (PRED FORTE) 1 % ophthalmic suspension INSTILL 1 DROP TO THE RIGHT EYE EVERY DAY  6 6/3/2025    Tiotropium Bromide Monohydrate (Spiriva Respimat) 1.25 MCG/ACT aerosol solution inhaler Inhale 2 puffs Daily. 4 g 8 6/3/2025       Hospital Scheduled Meds:  ipratropium-albuterol, 3 mL, Nebulization, Q6H While Awake - RT  sodium chloride, 10 mL, Intravenous, Q12H           Current listed hospital scheduled medications may not yet reflect those currently placed in orders that are signed and held awaiting patient's arrival to floor.   ---------------------------------------------------------------------------------------------------------------------     Objective     Vital Signs:  Temp:  [97.3 °F (36.3 °C)-98.1 °F (36.7 °C)] 97.3 °F (36.3 °C)  Heart Rate:  [] 66  Resp:  [15-28] 16  BP: ()/(56-94) 98/57      06/04/25  0946   Weight: 70.3 kg (155 lb)     Body mass index is 25.4  "kg/m².  ---------------------------------------------------------------------------------------------------------------------       Physical Exam  Vitals and nursing note reviewed.   Constitutional:       General: She is not in acute distress.  HENT:      Head: Normocephalic and atraumatic.   Eyes:      Extraocular Movements: Extraocular movements intact.   Cardiovascular:      Rate and Rhythm: Normal rate and regular rhythm.   Pulmonary:      Effort: Pulmonary effort is normal.      Breath sounds: Wheezing (faint, end expiratory) present.   Abdominal:      Palpations: Abdomen is soft.   Musculoskeletal:      Right lower leg: No edema.      Left lower leg: No edema.   Skin:     General: Skin is warm and dry.   Neurological:      Mental Status: She is alert. Mental status is at baseline.   Psychiatric:         Mood and Affect: Mood normal.         Behavior: Behavior normal.             ---------------------------------------------------------------------------------------------------------------------  EKG:      ---------------------------------------------------------------------------------------------------------------------   Results from last 7 days   Lab Units 05/30/25  1018   WBC 10*3/mm3 8.06   HEMOGLOBIN g/dL 15.6   HEMATOCRIT % 49.0*   MCV fL 93.7   MCHC g/dL 31.8   PLATELETS 10*3/mm3 272   INR  0.90         Results from last 7 days   Lab Units 05/30/25  1018   SODIUM mmol/L 135*   POTASSIUM mmol/L 3.9   CHLORIDE mmol/L 99   CO2 mmol/L 26.0   BUN mg/dL 10.2   CREATININE mg/dL 0.72   CALCIUM mg/dL 9.0   GLUCOSE mg/dL 88   Estimated Creatinine Clearance: 67.1 mL/min (by C-G formula based on SCr of 0.72 mg/dL).  No results found for: \"AMMONIA\"          No results found for: \"HGBA1C\"  Lab Results   Component Value Date    TSH 0.588 12/04/2024    FREET4 1.43 12/04/2024     No results found for: \"PREGTESTUR\", \"PREGSERUM\", \"HCG\", \"HCGQUANT\"  Pain Management Panel           No data to display              No " "results found for: \"BLOODCX\"  No results found for: \"URINECX\"  No results found for: \"WOUNDCX\"  No results found for: \"STOOLCX\"      ---------------------------------------------------------------------------------------------------------------------  Imaging Results (Last 7 Days)       Procedure Component Value Units Date/Time    XR Chest 1 View [[id number]] Collected: 06/04/25 1254     Updated: 06/04/25 1257    Narrative:      EXAM:    XR Chest, 1 View     EXAM DATE:    6/4/2025 12:11 PM     CLINICAL HISTORY:    sob; J98.59-Other diseases of mediastinum, not elsewhere classified     TECHNIQUE:    Frontal view of the chest.     COMPARISON:    11/15/2018     FINDINGS:    Lungs and pleural spaces:  Mild interstitial thickening with vascular  congestion.  No consolidation.  No pneumothorax.    Heart:  Mild cardiomegaly.    Mediastinum:  Unremarkable.  Normal mediastinal contour.    Bones/joints:  Unremarkable.  No acute fracture.       Impression:      1.  Mild interstitial thickening with vascular congestion.  2.  Mild cardiomegaly.     This report was finalized on 6/4/2025 12:55 PM by Dr. Toñito Ma MD.       FL Surgery Fluoro [[id number]] Collected: 06/04/25 1120     Updated: 06/04/25 1122    Narrative:      EXAMINATION: FL SURGERY FLUORO-      CLINICAL INDICATION:     bronch; J98.59-Other diseases of mediastinum,  not elsewhere classified     TECHNIQUE:  FL SURGERY FLUORO-      FLUOROSCOPY TIME: 0.2 minutes.     FINDINGS:   Intraoperative fluoroscopy for navigational bronchoscopy.       Impression:      As above.     This report was finalized on 6/4/2025 11:20 AM by Dr. Toñito Ma MD.               Cultures:  No results found for: \"BLOODCX\", \"URINECX\", \"WOUNDCX\", \"MRSACX\", \"RESPCX\", \"STOOLCX\"    Last echocardiogram:          I have personally reviewed the above radiology images and read the final radiology report on " 06/04/25  ---------------------------------------------------------------------------------------------------------------------  Assessment / Plan     Active Hospital Problems    Diagnosis  POA    **Mediastinal mass [J98.59]  Unknown    Acute hypoxic respiratory failure [J96.01]  Yes       ASSESSMENT/PLAN:    Mediastinal mass  Acute hypoxic respiratory failure  Complicated by underlying COPD  Patient presented for navigational bronchoscopy this morning, is being admitted after she developed hypoxic respiratory failure post op.  Chest x-ray showed interstitial thickening, vascular congestion and she is s/p Lasix, albuterol, 125 mg Solu-Medrol.  She is currently saturating low 90s on 5 L nasal cannula.  Admit to the telemetry unit for further workup and management  Pulmonology for further assistance and recommendations, appreciated.  Continue to monitor respiratory status closely-titrate supplemental O2 as needed and wean as able.  Continue scheduled DuoNebs.  Encourage cough/deep breathing, incentive spirometry  Will obtain CBC, BMP.  VBG is scheduled for tomorrow AM  Continue BiPAP as needed and at bedtime with settings per pulmonology recommendations.  Continue supportive care measures     Chronic:  HTN  Hypothyroidism  Hyperlipidemia  Continue home med regimen as indicated  Monitor vital signs  ----------  -DVT prophylaxis: SCD  -Activity: Up with assistance  -Expected length of stay: INPATIENT status due to the need for care which can only be reasonably provided in an hospital setting such as aggressive/expedited ancillary services and/or consultation services, the necessity for IV medications, close physician monitoring and/or the possible need for procedures.  In such, I feel patient’s risk for adverse outcomes and need for care warrant INPATIENT evaluation and predict the patient’s care encounter to likely last beyond 2 midnights.   -Disposition pending further clinical course and pulmonology  recommendations    High risk secondary to acute hypoxic respiratory failure    There are no questions and answers to display.       Mike Dykes PA-C   06/04/25  14:09 EDT

## 2025-06-04 NOTE — ANESTHESIA PROCEDURE NOTES
Airway  Reason: elective    Date/Time: 6/4/2025 10:42 AM  End Time:6/4/2025 10:42 AM  Airway not difficult    General Information and Staff    Patient location during procedure: OR  CRNA/CAA: Ernesto Javier CRNA    Indications and Patient Condition  Indications for airway management: airway protection    Preoxygenated: yes  MILS maintained throughout    Mask difficulty assessment: 0 - not attempted    Final Airway Details    Final airway type: endotracheal airway      Successful airway: ETT  Cuffed: yes   Successful intubation technique: direct laryngoscopy  Endotracheal tube insertion site: oral  Blade: Chandni  Blade size: 3  ETT size (mm): 8.5  Cormack-Lehane Classification: grade IIa - partial view of glottis  Placement verified by: chest auscultation, capnometry and palpation of cuff   Cuff volume (mL): 8  Measured from: lips  ETT/EBT  to lips (cm): 22  Number of attempts at approach: 1  Assessment: lips, teeth, and gum same as pre-op and atraumatic intubation

## 2025-06-04 NOTE — NURSING NOTE
Dr. Watts at bedside, explained to pt and family about procedure and what was done, looked at chest xray and stated it was okay. Explained pt would be admitted to floor. Dr. Watts assessed pt lungs and stated that they sounded better.

## 2025-06-04 NOTE — ANESTHESIA PREPROCEDURE EVALUATION
Anesthesia Evaluation     Patient summary reviewed and Nursing notes reviewed   history of anesthetic complications (only with surgery):  PONV  NPO Solid Status: > 8 hours  NPO Liquid Status: > 8 hours           Airway   Mallampati: II  TM distance: >3 FB  Neck ROM: full  Dental    (+) edentulous and partials    Pulmonary     breath sounds clear to auscultation  (+) a smoker Current, COPD, asthma,shortness of breath  Cardiovascular   Exercise tolerance: poor (<4 METS)    Rhythm: regular  Rate: normal    (+) hypertension, hyperlipidemia      Neuro/Psych  GI/Hepatic/Renal/Endo    (+) thyroid problem hyperthyroidism    Musculoskeletal (-) negative ROS    Abdominal     Abdomen: soft.   Substance History - negative use     OB/GYN negative ob/gyn ROS         Other - negative ROS                     Anesthesia Plan    ASA 3     general     intravenous induction     Anesthetic plan, risks, benefits, and alternatives have been provided, discussed and informed consent has been obtained with: patient.  Pre-procedure education provided  Use of blood products discussed with  Consented to blood products.    Plan discussed with CRNA.    CODE STATUS:

## 2025-06-04 NOTE — LETTER
[facility]  [address] [facility] [address] [address]  [phone]  [phone]        June 5, 2025      Patient: Christal Perez  YOB: 1950  Date of Visit: 5/29/2025      Pt discharging today from room 309B please and thank you :)        Ira Higuera RN

## 2025-06-04 NOTE — OP NOTE
Bronchoscopy Procedure Note    Date of Operation: 6/4/2025    Pre-op Diagnosis: Abnormal CT chest with mediastinal mass, mediastinal lymphadenopathy and left lower lobe nodule    Post-op Diagnosis: Abnormal CT chest with mediastinal mass, mediastinal lymphadenopathy and left lower lobe mass    Surgeon: Torres Watts MD    Assistants: None    Anesthesia: Please see anesthesia report for details    Operation: Flexible fiberoptic bronchoscopy, diagnostic, EBUS, Ion-robotic bronc, fluoroscopy and radial EBUS    Findings: No endobronchial masses or lesions were seen with normal bronchoscopy.  Lots of mucopurulent secretions were seen.  On navigational bronchoscopy left lower lobe pulmonary nodule was identified.  Specimen:   Bronchoalveolar lavage of left lower lobe    Bronchoalveolar lavage of lingula    Endobronchial ultrasound-guided transbronchial needle aspiration of station 7 lymph node-twice with multiple passes each time    Endobronchial ultrasound-guided transbronchial needle aspiration of right hilar mass twice with multiple passes each time    Navigational bronchoscopy, radial probe and fluoroscopy guided bronchoalveolar lavage of left lower lobe in and around the pulmonary nodule-once.  20 cc of saline was given and 10 cc was aspirated back.    Navigational bronchoscopy, radial probe and fluoroscopy guided transbronchial needle aspiration of left lower lobe pulmonary nodule-twice with multiple passes each time    Navigational bronchoscopy, radial probe and fluoroscopy guided bronchial brushings of left lower lobe in and around the pulmonary nodule    Bronchial washings    Estimated Blood Loss: Minimal    Complications: Mild bleeding which was controlled with local epinephrine and lidocaine spray    Indications and History:  The patient is a 75 y.o. female with abnormal CT chest the risks, benefits, complications, treatment options and expected outcomes were discussed with the patient.  The possibilities of  reaction to medication, pulmonary aspiration, perforation of a viscus, bleeding, failure to diagnose a condition and creating a complication requiring transfusion or operation were discussed with the patient who freely signed the consent.      Description of Procedure:  The patient was seen in the Holding Room and the site of surgery properly noted/marked.  The patient was taken to endoscopy suite, identified as Christal Perez and the procedure verified as Flexible Fiberoptic Bronchoscopy.  A Time Out was held and the above information confirmed.     the patient was positioned  and the bronchoscope was passed through the endotracheal tube.   2 ml 1 % lidocaine was used topically on the juhi.  Careful inspection of the tracheal lumen was accomplished. The scope was sequentially passed into the left main and then left upper and lower bronchi and segmental bronchi.       The scope was then withdrawn and advanced into the right main bronchus and then into the RUL, RML, and RLL bronchi and segmental bronchi.     Patient had lots of mucopurulent secretions which were suctioned and cleaned after giving multiple saline lavages.  Samples-    Bronchoalveolar lavage of left lower lobe was done by wedging the bronchoscope in the left lower lobe and close to 40 cc of saline was given once and close to 16 cc was aspirated back.    Bronchoalveolar lavage of lingula was done once by wedging the bronchoscope in the lingula and close to 40 cc of saline was given once and close to 14 cc was aspirated back.    Endobronchial ultrasound-guided transbronchial needle aspiration of station 7 lymph node-twice with multiple passes each time    Endobronchial ultrasound-guided transbronchial needle aspiration of right hilar mass twice with multiple passes each time    Navigational bronchoscopy, radial probe and fluoroscopy guided bronchoalveolar lavage of left lower lobe in and around the pulmonary nodule-once.  20 cc of saline was given and 10  cc was aspirated back.    Navigational bronchoscopy, radial probe and fluoroscopy guided transbronchial needle aspiration of left lower lobe pulmonary nodule-twice with multiple passes each time    Navigational bronchoscopy, radial probe and fluoroscopy guided bronchial brushings of left lower lobe in and around the pulmonary nodule    Bronchial washings          Samples were sent for cytology and microbiology.  Please follow up the results  The Patient was taken to the Endoscopy Recovery area in satisfactory condition.        Torres Watts MD

## 2025-06-04 NOTE — CONSULTS
Pulmonary and critical care consultation note     Referring Provider: dr fontenot   Reason for Consultation: COPD exacerbation after bronchoscopy      Chief complaint - sob      History of present illness:    Patient is a 75-year-old female with past medical history significant for current smoker, abnormal CT chest with mediastinal mass, hypertension, hypothyroidism, COPD and hyperlipidemia.  Presented today for bronchoscopy on the outpatient basis.  After bronchoscopy patient got short of breath and I placed her on nasal cannula oxygen.  Patient shortness of breath got worse and I started her on BiPAP.  Gave her multiple nebs treatment.  Also gave her Solu-Medrol and Lasix.  As patient's oxygen requirements was still very high and was requiring BiPAP this was discussed with the patient and patient's brother.  We admitted the patient for acute hypoxic respiratory failure and COPD exacerbation.  Case was discussed with Dr. Fontenot.    Images reviewed.  All the labs medications and the notes and vitals reviewed.  Review of Systems  History obtained from chart review and the patient  General ROS: negative for - chills, fatigue or fever  Psychological ROS: negative for - anxiety or depression  ENT ROS: negative for - headaches, visual changes or vocal changes  Respiratory ROS: positive for - cough and shortness of breath  Cardiovascular ROS: no chest pain or dyspnea on exertion  Gastrointestinal ROS: no abdominal pain, change in bowel habits, or black or bloody stools  Musculoskeletal ROS: negative for - joint pain, joint stiffness or joint swelling  Neurological ROS: no TIA or stroke symptoms  Hematological: no bleeding  Skin: no bruises, no rash        History  Past Medical History:   Diagnosis Date    Asthma     COPD (chronic obstructive pulmonary disease)     Disease of thyroid gland     GOITER    Fuchs' corneal dystrophy of right eye     HAD CORNEAL TRANSPLANT    History of transfusion     Hyperlipidemia      Hypertension     PONV (postoperative nausea and vomiting)    ,   Past Surgical History:   Procedure Laterality Date    CATARACT EXTRACTION      CHOLECYSTECTOMY      CORNEAL TRANSPLANT      EYE SURGERY      HYSTERECTOMY      ORIF WRIST FRACTURE Left    ,   Family History   Problem Relation Age of Onset    Lymphoma Mother     Lung cancer Father     Lung cancer Brother     Breast cancer Neg Hx    ,   Social History     Tobacco Use    Smoking status: Heavy Smoker     Current packs/day: 1.00     Average packs/day: 1 pack/day for 60.4 years (60.4 ttl pk-yrs)     Types: Cigarettes     Start date: 1965     Passive exposure: Current    Smokeless tobacco: Never   Vaping Use    Vaping status: Former    Substances: Nicotine, Flavoring    Devices: Disposable   Substance Use Topics    Alcohol use: Not Currently    Drug use: Yes     Types: Hydromorphone   ,   Medications Prior to Admission   Medication Sig Dispense Refill Last Dose/Taking    albuterol sulfate  (90 Base) MCG/ACT inhaler Inhalation for 17 Days   6/4/2025 at  7:00 AM    lisinopril (PRINIVIL,ZESTRIL) 20 MG tablet Take 1 tablet by mouth.   6/3/2025    methIMAzole (TAPAZOLE) 5 MG tablet Take 1 tablet by mouth Every Other Day. 45 tablet 1 6/3/2025    prednisoLONE acetate (PRED FORTE) 1 % ophthalmic suspension INSTILL 1 DROP TO THE RIGHT EYE EVERY DAY  6 6/3/2025    Tiotropium Bromide Monohydrate (Spiriva Respimat) 1.25 MCG/ACT aerosol solution inhaler Inhale 2 puffs Daily. 4 g 8 6/3/2025   , Scheduled Meds:  ipratropium-albuterol, 3 mL, Nebulization, Q6H While Awake - RT  sodium chloride, 10 mL, Intravenous, Q12H    , Continuous Infusions:    and Allergies:  Pneumococcal vaccines, Sulfa antibiotics, and Statins    Objective     Vital Signs   Temp:  [97.3 °F (36.3 °C)-98.1 °F (36.7 °C)] 97.5 °F (36.4 °C)  Heart Rate:  [] 68  Resp:  [15-28] 18  BP: ()/(54-94) 115/54    Physical Exam:             General- normal in appearance, not in any acute distress,  wearing nasal cannula oxygen     HEENT- pupils equally reactive to light, normal in size, no scleral icterus    Neck-supple    Respiratory-respirations normal-on auscultation no wheezing no crackles, wearing nasal cannula oxygen     Cardiovascular-  Normal S1 and S2. No S3, S4 or murmurs. No JVD, no carotid bruit and no edema, pulses normal bilaterally     GI-nontender nondistended bowel sounds positive    CNS-nonfocal    Musculoskeletal -no edema  Extremities- no obvious deformity noticed     Psychiatric-mood good, good eye contact, alert awake oriented  Skin- no visible rash                                                                   Results Review:    LABS:    Lab Results   Component Value Date    GLUCOSE 88 05/30/2025    BUN 10.2 05/30/2025    CREATININE 0.72 05/30/2025    EGFRIFNONA 68 11/15/2018    BCR 14.2 05/30/2025    CO2 26.0 05/30/2025    CALCIUM 9.0 05/30/2025    ALBUMIN 4.0 12/04/2024    AST 19 12/04/2024    ALT 14 12/04/2024    WBC 8.06 05/30/2025    HGB 15.6 05/30/2025    HCT 49.0 (H) 05/30/2025    MCV 93.7 05/30/2025     05/30/2025     (L) 05/30/2025    K 3.9 05/30/2025    CL 99 05/30/2025    ANIONGAP 10.0 05/30/2025       Lab Results   Component Value Date    INR 0.90 05/30/2025    PROTIME 12.7 05/30/2025       Results from last 7 days   Lab Units 05/30/25  1018   INR  0.90   APTT seconds 30.6          I reviewed the patient's new clinical results.  I reviewed the patient's new imaging results and agree with the interpretation.   Latest Reference Range & Units 06/04/25 00:00 06/04/25 10:48 06/04/25 10:51 06/04/25 11:12 06/04/25 12:22 06/04/25 15:05   Sodium 136 - 145 mmol/L      136   Potassium 3.5 - 5.2 mmol/L      3.5   Chloride 98 - 107 mmol/L      96 (L)   CO2 22.0 - 29.0 mmol/L      26.0   Anion Gap 5.0 - 15.0 mmol/L      14.0   BUN 8.0 - 23.0 mg/dL      12.2   Creatinine 0.57 - 1.00 mg/dL      0.89   BUN/Creatinine Ratio 7.0 - 25.0       13.7   eGFR >60.0 mL/min/1.73       67.7   Glucose 65 - 99 mg/dL      154 (H)   Calcium 8.6 - 10.5 mg/dL      8.7   WBC 3.40 - 10.80 10*3/mm3      17.60 (H)   RBC 3.77 - 5.28 10*6/mm3      5.30 (H)   Hemoglobin 12.0 - 15.9 g/dL      15.7   Hematocrit 34.0 - 46.6 %      50.2 (H)   Platelets 140 - 450 10*3/mm3      263   RDW 12.3 - 15.4 %      13.3   MCV 79.0 - 97.0 fL      94.7   MCH 26.6 - 33.0 pg      29.6   MCHC 31.5 - 35.7 g/dL      31.3 (L)   MPV 6.0 - 12.0 fL      10.8   RDW-SD 37.0 - 54.0 fl      46.9   Neutrophil Rel % 42.7 - 76.0 %      87.9 (H)   Lymphocyte Rel % 19.6 - 45.3 %      4.7 (L)   Monocyte Rel % 5.0 - 12.0 %      6.4   Eosinophil Rel % 0.3 - 6.2 %      0.1 (L)   Basophil Rel % 0.0 - 1.5 %      0.2   Immature Granulocyte Rel % 0.0 - 0.5 %      0.7 (H)   Neutrophils Absolute 1.70 - 7.00 10*3/mm3      15.49 (H)   Lymphocytes Absolute 0.70 - 3.10 10*3/mm3      0.82   Monocytes Absolute 0.10 - 0.90 10*3/mm3      1.12 (H)   Eosinophils Absolute 0.00 - 0.40 10*3/mm3      0.01   Basophils Absolute 0.00 - 0.20 10*3/mm3      0.04   Immature Grans, Absolute 0.00 - 0.05 10*3/mm3      0.12 (H)   nRBC 0.0 - 0.2 /100 WBC      0.0   AFB Culture   Rpt (IP)  Rpt (IP) Rpt (IP)      BAL Culture, Quantitative   Rpt (P)  Rpt (P)       Fungus Culture   Rpt (IP)  Rpt (IP) Rpt (IP)      Respiratory Culture    Rpt (P)      [facility] ([facility])   Rpt (IP)       XR Chest 1 View      Rpt    FL Surgery Fluoro     Rpt     Telemetry Scan  Rpt        (L): Data is abnormally low  (H): Data is abnormally high  (IP): In Process  (P): Preliminary  Rpt: View report in Results Review for more information    Assessment & Plan     Neurology-alert awake oriented.  Continue to monitor       Respiratory-acute hypoxic respiratory failure-s/p bronchoscopy -navigational-  And COPD exacerbation.  FiO2 requirement reviewed and titrated to maintain saturation 88 to 92%.  Chest x-ray reviewed and shows slight volume overloaded state.  Continue  steroids-discussed with Dr. Bernal  Continue nebs  Will continue BiPAP.  Settings and graphics reviewed.  Will get blood gas in the morning.  Will get ambulatory pulse oximetry in the morning.  Give 1 dose of diuretics to improve lung compliance and diffusion capacity.    Will get CBC and BMP in the morning and repeat diuretics.    Microbiology and cytology from bronchoscopy sent and pending.    Cardiology- hemodynamically -stable.  Hypertension-continue current medications.  Continue to monitor HR- rate and rhythm, BP     Nephrology- Cr and BUN reviewed  I/O-reviewed    GI-continue current diet    Hematology- CBC  Latest reviewed  ID  Culture-pending      Endrocrinology- Maintain Blood sugar 140 -180      Electrolytes-   Mag and phos       DVT prophylaxis-continue      Family member present-Brother Case was discussed with him and answered his questions to his satisfaction.        Echo-  No results found for this or any previous visit.         Mediastinal mass    Acute hypoxic respiratory failure          Torres Watts MD  06/04/25  15:00 EDT

## 2025-06-05 VITALS
SYSTOLIC BLOOD PRESSURE: 130 MMHG | RESPIRATION RATE: 14 BRPM | OXYGEN SATURATION: 92 % | TEMPERATURE: 98.8 F | WEIGHT: 153.2 LBS | BODY MASS INDEX: 24.62 KG/M2 | HEART RATE: 91 BPM | DIASTOLIC BLOOD PRESSURE: 58 MMHG | HEIGHT: 66 IN

## 2025-06-05 PROBLEM — J96.01 ACUTE HYPOXIC RESPIRATORY FAILURE: Status: RESOLVED | Noted: 2025-06-04 | Resolved: 2025-06-05

## 2025-06-05 LAB
ACID FAST STN SPEC: NEGATIVE
ANION GAP SERPL CALCULATED.3IONS-SCNC: 11.2 MMOL/L (ref 5–15)
ATMOSPHERIC PRESS: 727 MMHG
BASE EXCESS BLDV CALC-SCNC: 6.3 MMOL/L (ref 0–2)
BASOPHILS # BLD AUTO: 0.02 10*3/MM3 (ref 0–0.2)
BASOPHILS NFR BLD AUTO: 0.1 % (ref 0–1.5)
BDY SITE: ABNORMAL
BUN SERPL-MCNC: 14.4 MG/DL (ref 8–23)
BUN/CREAT SERPL: 17.1 (ref 7–25)
CALCIUM SPEC-SCNC: 8.5 MG/DL (ref 8.6–10.5)
CHLORIDE SERPL-SCNC: 98 MMOL/L (ref 98–107)
CO2 BLDA-SCNC: 33 MMOL/L (ref 22–33)
CO2 SERPL-SCNC: 24.8 MMOL/L (ref 22–29)
COHGB MFR BLD: 1.2 % (ref 0–5)
CREAT SERPL-MCNC: 0.84 MG/DL (ref 0.57–1)
DEPRECATED RDW RBC AUTO: 46.7 FL (ref 37–54)
EGFRCR SERPLBLD CKD-EPI 2021: 72.6 ML/MIN/1.73
EOSINOPHIL # BLD AUTO: 0 10*3/MM3 (ref 0–0.4)
EOSINOPHIL NFR BLD AUTO: 0 % (ref 0.3–6.2)
ERYTHROCYTE [DISTWIDTH] IN BLOOD BY AUTOMATED COUNT: 13.3 % (ref 12.3–15.4)
GAS FLOW AIRWAY: 3 LPM
GLUCOSE SERPL-MCNC: 118 MG/DL (ref 65–99)
HCO3 BLDV-SCNC: 31.6 MMOL/L (ref 22–28)
HCT VFR BLD AUTO: 44.7 % (ref 34–46.6)
HGB BLD-MCNC: 14.4 G/DL (ref 12–15.9)
HGB BLDA-MCNC: 14.6 G/DL (ref 13.5–17.5)
IMM GRANULOCYTES # BLD AUTO: 0.07 10*3/MM3 (ref 0–0.05)
IMM GRANULOCYTES NFR BLD AUTO: 0.5 % (ref 0–0.5)
INHALED O2 CONCENTRATION: 32 %
LYMPHOCYTES # BLD AUTO: 1.12 10*3/MM3 (ref 0.7–3.1)
LYMPHOCYTES NFR BLD AUTO: 7.3 % (ref 19.6–45.3)
Lab: ABNORMAL
MAGNESIUM SERPL-MCNC: 2.4 MG/DL (ref 1.6–2.4)
MCH RBC QN AUTO: 30.3 PG (ref 26.6–33)
MCHC RBC AUTO-ENTMCNC: 32.2 G/DL (ref 31.5–35.7)
MCV RBC AUTO: 93.9 FL (ref 79–97)
METHGB BLD QL: 0.4 % (ref 0–3)
MODALITY: ABNORMAL
MONOCYTES # BLD AUTO: 1.39 10*3/MM3 (ref 0.1–0.9)
MONOCYTES NFR BLD AUTO: 9.1 % (ref 5–12)
NEUTROPHILS NFR BLD AUTO: 12.75 10*3/MM3 (ref 1.7–7)
NEUTROPHILS NFR BLD AUTO: 83 % (ref 42.7–76)
NRBC BLD AUTO-RTO: 0 /100 WBC (ref 0–0.2)
OXYHGB MFR BLDV: 90.9 % (ref 45–75)
PCO2 BLDV: 46.7 MM HG (ref 41–51)
PH BLDV: 7.44 PH UNITS (ref 7.32–7.42)
PHOSPHATE SERPL-MCNC: 3.7 MG/DL (ref 2.5–4.5)
PLATELET # BLD AUTO: 255 10*3/MM3 (ref 140–450)
PMV BLD AUTO: 12.3 FL (ref 6–12)
PO2 BLDV: 57.9 MM HG (ref 27–53)
POTASSIUM SERPL-SCNC: 4.4 MMOL/L (ref 3.5–5.2)
RBC # BLD AUTO: 4.76 10*6/MM3 (ref 3.77–5.28)
SAO2 % BLDCOV: 92.4 % (ref 45–75)
SODIUM SERPL-SCNC: 134 MMOL/L (ref 136–145)
SPECIMEN PREPARATION: NORMAL
VENTILATOR MODE: ABNORMAL
WBC NRBC COR # BLD AUTO: 15.35 10*3/MM3 (ref 3.4–10.8)

## 2025-06-05 PROCEDURE — 94799 UNLISTED PULMONARY SVC/PX: CPT

## 2025-06-05 PROCEDURE — 82820 HEMOGLOBIN-OXYGEN AFFINITY: CPT

## 2025-06-05 PROCEDURE — 84100 ASSAY OF PHOSPHORUS: CPT | Performed by: INTERNAL MEDICINE

## 2025-06-05 PROCEDURE — 25010000002 FUROSEMIDE PER 20 MG: Performed by: INTERNAL MEDICINE

## 2025-06-05 PROCEDURE — 80048 BASIC METABOLIC PNL TOTAL CA: CPT | Performed by: INTERNAL MEDICINE

## 2025-06-05 PROCEDURE — 83735 ASSAY OF MAGNESIUM: CPT | Performed by: INTERNAL MEDICINE

## 2025-06-05 PROCEDURE — 94761 N-INVAS EAR/PLS OXIMETRY MLT: CPT

## 2025-06-05 PROCEDURE — 25010000002 METHYLPREDNISOLONE PER 40 MG: Performed by: INTERNAL MEDICINE

## 2025-06-05 PROCEDURE — 85025 COMPLETE CBC W/AUTO DIFF WBC: CPT | Performed by: INTERNAL MEDICINE

## 2025-06-05 PROCEDURE — 99239 HOSP IP/OBS DSCHRG MGMT >30: CPT

## 2025-06-05 PROCEDURE — 82805 BLOOD GASES W/O2 SATURATION: CPT

## 2025-06-05 PROCEDURE — 94664 DEMO&/EVAL PT USE INHALER: CPT

## 2025-06-05 PROCEDURE — 99232 SBSQ HOSP IP/OBS MODERATE 35: CPT | Performed by: INTERNAL MEDICINE

## 2025-06-05 PROCEDURE — 94660 CPAP INITIATION&MGMT: CPT

## 2025-06-05 RX ORDER — PREDNISONE 10 MG/1
TABLET ORAL
Qty: 21 TABLET | Refills: 0 | Status: SHIPPED | OUTPATIENT
Start: 2025-06-05 | End: 2025-06-15

## 2025-06-05 RX ORDER — FUROSEMIDE 10 MG/ML
40 INJECTION INTRAMUSCULAR; INTRAVENOUS ONCE
Status: COMPLETED | OUTPATIENT
Start: 2025-06-05 | End: 2025-06-05

## 2025-06-05 RX ORDER — PREDNISOLONE ACETATE 10 MG/ML
1 SUSPENSION/ DROPS OPHTHALMIC NIGHTLY
Status: DISCONTINUED | OUTPATIENT
Start: 2025-06-05 | End: 2025-06-05 | Stop reason: HOSPADM

## 2025-06-05 RX ORDER — FUROSEMIDE 10 MG/ML
20 INJECTION INTRAMUSCULAR; INTRAVENOUS ONCE
Status: COMPLETED | OUTPATIENT
Start: 2025-06-05 | End: 2025-06-05

## 2025-06-05 RX ORDER — NICOTINE 21 MG/24HR
1 PATCH, TRANSDERMAL 24 HOURS TRANSDERMAL
Status: DISCONTINUED | OUTPATIENT
Start: 2025-06-05 | End: 2025-06-05 | Stop reason: HOSPADM

## 2025-06-05 RX ORDER — POTASSIUM CHLORIDE 1.5 G/1.58G
40 POWDER, FOR SOLUTION ORAL 2 TIMES DAILY
Status: DISCONTINUED | OUTPATIENT
Start: 2025-06-05 | End: 2025-06-05 | Stop reason: HOSPADM

## 2025-06-05 RX ADMIN — IPRATROPIUM BROMIDE AND ALBUTEROL SULFATE 3 ML: .5; 2.5 SOLUTION RESPIRATORY (INHALATION) at 13:00

## 2025-06-05 RX ADMIN — Medication 10 ML: at 09:47

## 2025-06-05 RX ADMIN — METHIMAZOLE 5 MG: 10 TABLET ORAL at 09:46

## 2025-06-05 RX ADMIN — FUROSEMIDE 20 MG: 10 INJECTION, SOLUTION INTRAMUSCULAR; INTRAVENOUS at 05:59

## 2025-06-05 RX ADMIN — NICOTINE TRANSDERMAL SYSTEM 1 PATCH: 21 PATCH, EXTENDED RELEASE TRANSDERMAL at 09:52

## 2025-06-05 RX ADMIN — FUROSEMIDE 40 MG: 10 INJECTION, SOLUTION INTRAMUSCULAR; INTRAVENOUS at 11:29

## 2025-06-05 RX ADMIN — POTASSIUM CHLORIDE 40 MEQ: 1.5 POWDER, FOR SOLUTION ORAL at 09:46

## 2025-06-05 RX ADMIN — IPRATROPIUM BROMIDE AND ALBUTEROL SULFATE 3 ML: .5; 2.5 SOLUTION RESPIRATORY (INHALATION) at 06:59

## 2025-06-05 RX ADMIN — METHYLPREDNISOLONE SODIUM SUCCINATE 40 MG: 40 INJECTION, POWDER, LYOPHILIZED, FOR SOLUTION INTRAMUSCULAR; INTRAVENOUS at 05:59

## 2025-06-05 NOTE — PROGRESS NOTES
Pulmonary and critical care consultation note     Referring Provider: dr fontenot   Reason for Consultation: COPD exacerbation after bronchoscopy      Chief complaint - sob      Sub-   Overnight events reviewed.  All the labs medications and the notes and vitals reviewed.  Did ambulatory pulse oximetry.  Discussed case with patient's nurse and Dr. Fontenot.  If patient desaturates when ambulatory pulse oximetry patient can be discharged home on oxygen.  Review of Systems  Positive for shortness of breath otherwise negative        History  Past Medical History:   Diagnosis Date    Asthma     COPD (chronic obstructive pulmonary disease)     Disease of thyroid gland     GOITER    Fuchs' corneal dystrophy of right eye     HAD CORNEAL TRANSPLANT    History of transfusion     Hyperlipidemia     Hypertension     PONV (postoperative nausea and vomiting)    ,   Past Surgical History:   Procedure Laterality Date    CATARACT EXTRACTION      CHOLECYSTECTOMY      CORNEAL TRANSPLANT      EYE SURGERY      HYSTERECTOMY      ORIF WRIST FRACTURE Left    ,   Family History   Problem Relation Age of Onset    Lymphoma Mother     Lung cancer Father     Lung cancer Brother     Breast cancer Neg Hx    ,   Social History     Tobacco Use    Smoking status: Heavy Smoker     Current packs/day: 1.00     Average packs/day: 1 pack/day for 60.4 years (60.4 ttl pk-yrs)     Types: Cigarettes     Start date: 1965     Passive exposure: Current    Smokeless tobacco: Never   Vaping Use    Vaping status: Former    Substances: Nicotine, Flavoring    Devices: Disposable   Substance Use Topics    Alcohol use: Not Currently    Drug use: Yes     Types: Hydromorphone   ,   Medications Prior to Admission   Medication Sig Dispense Refill Last Dose/Taking    albuterol sulfate  (90 Base) MCG/ACT inhaler Inhale 1 puff Every 6 (Six) Hours As Needed for Wheezing or Shortness of Air.   6/4/2025 at  7:00 AM    [Paused] lisinopril (PRINIVIL,ZESTRIL) 20 MG tablet Take 1  tablet by mouth Daily.   6/3/2025    methIMAzole (TAPAZOLE) 5 MG tablet Take 1 tablet by mouth Every Other Day. 45 tablet 1 6/3/2025    prednisoLONE acetate (PRED FORTE) 1 % ophthalmic suspension Administer 1 drop to the right eye Daily.  6 6/3/2025    Tiotropium Bromide Monohydrate (Spiriva Respimat) 1.25 MCG/ACT aerosol solution inhaler Inhale 1 puff 2 (Two) Times a Day.   6/3/2025   , Scheduled Meds:  ipratropium-albuterol, 3 mL, Nebulization, Q6H While Awake - RT  methIMAzole, 5 mg, Oral, Every Other Day  methylPREDNISolone sodium succinate, 40 mg, Intravenous, Q12H  nicotine, 1 patch, Transdermal, Q24H  potassium chloride, 40 mEq, Oral, BID  prednisoLONE acetate, 1 drop, Right Eye, Nightly  sodium chloride, 10 mL, Intravenous, Q12H    , Continuous Infusions:    and Allergies:  Pneumococcal vaccines, Sulfa antibiotics, and Statins    Objective     Vital Signs   Temp:  [97.5 °F (36.4 °C)-98.8 °F (37.1 °C)] 98.8 °F (37.1 °C)  Heart Rate:  [62-87] 76  Resp:  [14-23] 14  BP: ()/(48-72) 130/58    Physical Exam:             General- normal in appearance, not in any acute distress, wearing nasal cannula oxygen     HEENT- pupils equally reactive to light, normal in size, no scleral icterus    Neck-supple    Respiratory-respirations normal-on auscultation no wheezing no crackles, wearing nasal cannula oxygen     Cardiovascular-  NSR    GI-NT ND     CNS-nonfocal    Musculoskeletal -no edema  Extremities- no obvious deformity noticed     Psychiatric- alert awake oriented  Skin- no visible rash                                                                   Results Review:    LABS:    Lab Results   Component Value Date    GLUCOSE 118 (H) 06/05/2025    BUN 14.4 06/05/2025    CREATININE 0.84 06/05/2025    EGFRIFNONA 68 11/15/2018    BCR 17.1 06/05/2025    CO2 24.8 06/05/2025    CALCIUM 8.5 (L) 06/05/2025    ALBUMIN 4.0 12/04/2024    AST 19 12/04/2024    ALT 14 12/04/2024    WBC 15.35 (H) 06/05/2025    HGB 14.4  06/05/2025    HCT 44.7 06/05/2025    MCV 93.9 06/05/2025     06/05/2025     (L) 06/05/2025    K 4.4 06/05/2025    CL 98 06/05/2025    ANIONGAP 11.2 06/05/2025       Lab Results   Component Value Date    INR 0.90 05/30/2025    PROTIME 12.7 05/30/2025       Results from last 7 days   Lab Units 05/30/25  1018   INR  0.90   APTT seconds 30.6          I reviewed the patient's new clinical results.  I reviewed the patient's new imaging results and agree with the interpretation.   Latest Reference Range & Units 06/04/25 00:00 06/04/25 10:48 06/04/25 10:51 06/04/25 11:12 06/04/25 12:22 06/04/25 15:05   Sodium 136 - 145 mmol/L      136   Potassium 3.5 - 5.2 mmol/L      3.5   Chloride 98 - 107 mmol/L      96 (L)   CO2 22.0 - 29.0 mmol/L      26.0   Anion Gap 5.0 - 15.0 mmol/L      14.0   BUN 8.0 - 23.0 mg/dL      12.2   Creatinine 0.57 - 1.00 mg/dL      0.89   BUN/Creatinine Ratio 7.0 - 25.0       13.7   eGFR >60.0 mL/min/1.73      67.7   Glucose 65 - 99 mg/dL      154 (H)   Calcium 8.6 - 10.5 mg/dL      8.7   WBC 3.40 - 10.80 10*3/mm3      17.60 (H)   RBC 3.77 - 5.28 10*6/mm3      5.30 (H)   Hemoglobin 12.0 - 15.9 g/dL      15.7   Hematocrit 34.0 - 46.6 %      50.2 (H)   Platelets 140 - 450 10*3/mm3      263   RDW 12.3 - 15.4 %      13.3   MCV 79.0 - 97.0 fL      94.7   MCH 26.6 - 33.0 pg      29.6   MCHC 31.5 - 35.7 g/dL      31.3 (L)   MPV 6.0 - 12.0 fL      10.8   RDW-SD 37.0 - 54.0 fl      46.9   Neutrophil Rel % 42.7 - 76.0 %      87.9 (H)   Lymphocyte Rel % 19.6 - 45.3 %      4.7 (L)   Monocyte Rel % 5.0 - 12.0 %      6.4   Eosinophil Rel % 0.3 - 6.2 %      0.1 (L)   Basophil Rel % 0.0 - 1.5 %      0.2   Immature Granulocyte Rel % 0.0 - 0.5 %      0.7 (H)   Neutrophils Absolute 1.70 - 7.00 10*3/mm3      15.49 (H)   Lymphocytes Absolute 0.70 - 3.10 10*3/mm3      0.82   Monocytes Absolute 0.10 - 0.90 10*3/mm3      1.12 (H)   Eosinophils Absolute 0.00 - 0.40 10*3/mm3      0.01   Basophils Absolute 0.00 - 0.20  10*3/mm3      0.04   Immature Grans, Absolute 0.00 - 0.05 10*3/mm3      0.12 (H)   nRBC 0.0 - 0.2 /100 WBC      0.0   AFB Culture   Rpt (IP)  Rpt (IP) Rpt (IP)      BAL Culture, Quantitative   Rpt (P)  Rpt (P)       Fungus Culture   Rpt (IP)  Rpt (IP) Rpt (IP)      Respiratory Culture    Rpt (P)      [facility]N CYTOLOGY, [facility] ([facility])   Rpt (IP)       XR Chest 1 View      Rpt    FL Surgery Fluoro     Rpt     Telemetry Scan  Rpt        (L): Data is abnormally low  (H): Data is abnormally high  (IP): In Process  (P): Preliminary  Rpt: View report in Results Review for more information        Assessment & Plan     Neurology-alert awake oriented.  Continue to monitor       Respiratory-acute hypoxic respiratory failure-s/p bronchoscopy -navigational-  And COPD exacerbation.  FiO2 requirement reviewed and titrated to maintain saturation 88 to 92%.  Chest x-ray reviewed and shows slight volume overloaded state.  Gave diuretics  Responded well.  Repeated the dose in the morning.  Repleted electrolytes.  If patient desaturated during ambulatory pulse oximetry follow-up with pulm in the outpatient basis.  ABG from today morning reviewed and does not show any major acid-base disorder  Did not use BiPAP.  Bronchoscopy microbiology and cytology pending  PFTs reviewed and discussed with patient    Cardiology- hemodynamically -stable.  Hypertension-continue current medications.  Continue to monitor HR- rate and rhythm, BP     Nephrology- Cr and BUN reviewed  I/O-reviewed    GI-continue current diet    Hematology- CBC  Latest reviewed  ID  Culture-pending      Endrocrinology- Maintain Blood sugar 140 -180      Electrolytes-   Mag and phos       DVT prophylaxis-continue    Family members-none present today      Echo-  No results found for this or any previous visit.         Mediastinal mass          Torres Watts MD  06/05/25  12:17 EDT

## 2025-06-05 NOTE — CASE MANAGEMENT/SOCIAL WORK
Continued Stay Note  [facility]IPOL[facility] Dowell     Patient Name: Christal Perez  MRN: [id number]  Today's Date: 6/5/2025    Admit Date: 6/4/2025    Plan: DME order noted for home going O2; this CM met with pt at bedside and choiced for [facility]; all required information faxed [facility] with room number, and notification of discharge.  Addendum 6/[id number]: This CM spoke with [place] at [facility] whom, verified all required information has been received.   Ira Higuera RN

## 2025-06-05 NOTE — DISCHARGE SUMMARY
[facility] HOSPITALIST DISCHARGE SUMMARY    Patient Identification:  Name:  Christal Perez  Age:  75 y.o.  Sex:  female  :  1950  MRN:  [id number]  Visit Number:  [id number]    Date of Admission: 2025  Date of Discharge:  25     PCP: Jose Coley APRN    Discharging Provider: Vinnie Dykes PA-C / Dr. Bernal    Discharge Diagnoses     Discharge Diagnoses:  Acute hypoxic respiratory failure, improving  Mediastinal mass s/p navigational bronchoscopy  Acute COPD exacerbation, resolving    Secondary Diagnoses:  HTN  Hypothyroidism  Hyperlipidemia    Needs on follow up:  Keep follow-up appointment with pulmonology  PCP 1 week  Consults/Procedures     Consults:   Consults       Date and Time Order Name Status Description    2025  1:56 PM Inpatient Pulmonology Consult              Procedures/Scans Performed:  Procedure(s):  BRONCHOSCOPY WITH ENDOBRONCHIAL ULTRASOUND AND NAVIGATION   CXR    History of Presenting Illness     No chief complaint on file.      Patient is a 75 y.o. female who presented to [facility] complaining of respiratory failure.  Please see the admitting history and physical for further details.      Hospital Course     Patient was admitted to [facility] on 25.  She had originally presented for outpatient bronchoscopy with planned biopsy of recently discovered mediastinal mass.  Postoperatively developed acute respiratory failure with hypoxia.  CXR was obtained which showed interstitial thickening, vascular congestion.  She was given IV Lasix and albuterol as well as 125 mg Solu-Medrol and admitted for further observation and management.  Pulmonology was consulted and followed as well.  She was initially requiring 5 L nasal cannula but with treatment has been weaned to 2 L nasal cannula by the date of this documentation.  She received additional doses of IV steroid and Lasix today per pulmonology recommendations.  On follow-up exam this morning she was  feeling improved, near baseline, and requesting discharge home.  Pulmonology did follow-up and recommended pulse oximetry to determine supplemental O2 need moving forward and patient did desaturate to 84% on room air with ambulation.  As such supplemental O2 will be ordered at discharge.  Per pulmonology patient otherwise felt to be clinically stable for discharge home on this date.  Biopsy results pending at this time, majority of cultures obtained during bronchoscopy are pending at this time-BAL culture is preliminarily negative, respiratory culture showing scant growth of normal respiratory nina preliminarily.  Patient is scheduled for follow-up with pulmonology in clinic early next month and she was encouraged to keep this appointment.  At discharge we will continue steroid taper per pulmonology recommendation.  Of note patient's blood pressure trend has been largely at goal, 130/58 max with her home lisinopril held.  Will continue to hold lisinopril at discharge and defer resuming this medication to PCP.  Patient will also be scheduled follow-up with her PCP within 1 week.  The above discharge and follow-up plan as well as medication change was discussed with the patient on the date of this documentation and she expressed agreement and understanding.    Discharge Vitals/Physical Examination     Vital Signs:  Temp:  [97.3 °F (36.3 °C)-98.8 °F (37.1 °C)] 98.8 °F (37.1 °C)  Heart Rate:  [] 76  Resp:  [14-28] 14  BP: ()/(48-94) 130/58  Mean Arterial Pressure (Non-Invasive) for the past 24 hrs (Last 3 readings):   Noninvasive MAP (mmHg)   06/05/25 1057 82   06/05/25 0649 94   06/05/25 0331 76     SpO2 Percentage    06/05/25 0709 06/05/25 1057 06/05/25 1100   SpO2: 95% 93% (!) 84%  Comment: walking     SpO2:  [84 %-96 %] 84 %  on  Flow (L/min) (Oxygen Therapy):  [1.5-10] 2;   Device (Oxygen Therapy): nasal cannula    Body mass index is 25.11 kg/m².  Wt Readings from Last 3 Encounters:   06/05/25 69.5 kg  "(153 lb 3.2 oz)   02/03/25 69.9 kg (154 lb)   01/03/25 70.3 kg (155 lb)         Physical Exam:  Physical Exam  Vitals and nursing note reviewed.   Constitutional:       General: She is not in acute distress.  HENT:      Head: Normocephalic and atraumatic.   Eyes:      Extraocular Movements: Extraocular movements intact.   Cardiovascular:      Rate and Rhythm: Normal rate and regular rhythm.   Pulmonary:      Effort: Pulmonary effort is normal.      Breath sounds: Normal breath sounds.   Abdominal:      Palpations: Abdomen is soft.   Musculoskeletal:      Right lower leg: No edema.      Left lower leg: No edema.   Skin:     General: Skin is warm and dry.   Neurological:      Mental Status: She is alert. Mental status is at baseline.   Psychiatric:         Mood and Affect: Mood normal.         Behavior: Behavior normal.         Pertinent Laboratory/Radiology Results     Pertinent Laboratory Results:            Results from last 7 days   Lab Units 06/05/25  0547 06/04/25  1505 05/30/25  1018   WBC 10*3/mm3 15.35* 17.60* 8.06   HEMOGLOBIN g/dL 14.4 15.7 15.6   HEMATOCRIT % 44.7 50.2* 49.0*   MCV fL 93.9 94.7 93.7   MCHC g/dL 32.2 31.3* 31.8   PLATELETS 10*3/mm3 255 263 272   INR   --   --  0.90     Results from last 7 days   Lab Units 06/05/25  0547 06/04/25  1505 05/30/25  1018   SODIUM mmol/L 134* 136 135*   POTASSIUM mmol/L 4.4 3.5 3.9   MAGNESIUM mg/dL 2.4  --   --    CHLORIDE mmol/L 98 96* 99   CO2 mmol/L 24.8 26.0 26.0   BUN mg/dL 14.4 12.2 10.2   CREATININE mg/dL 0.84 0.89 0.72   CALCIUM mg/dL 8.5* 8.7 9.0   GLUCOSE mg/dL 118* 154* 88   Estimated Creatinine Clearance: 63.5 mL/min (by C-G formula based on SCr of 0.84 mg/dL).  No results found for: \"AMMONIA\"    No results found for: \"HGBA1C\", \"POCGLU\"  No results found for: \"HGBA1C\"  Lab Results   Component Value Date    TSH 0.588 12/04/2024    FREET4 1.43 12/04/2024       No results found for: \"BLOODCX\"  No results found for: \"URINECX\"  No results found for: " "\"WOUNDCX\"  No results found for: \"STOOLCX\"  No results found for: \"RESPCX\"  Pain Management Panel           No data to display                Pertinent Radiology Results:  Imaging Results (All)       Procedure Component Value Units Date/Time    XR Chest 1 View [[id number]] Collected: 06/04/25 1254     Updated: 06/04/25 1257    Narrative:      EXAM:    XR Chest, 1 View     EXAM DATE:    6/4/2025 12:11 PM     CLINICAL HISTORY:    sob; J98.59-Other diseases of mediastinum, not elsewhere classified     TECHNIQUE:    Frontal view of the chest.     COMPARISON:    11/15/2018     FINDINGS:    Lungs and pleural spaces:  Mild interstitial thickening with vascular  congestion.  No consolidation.  No pneumothorax.    Heart:  Mild cardiomegaly.    Mediastinum:  Unremarkable.  Normal mediastinal contour.    Bones/joints:  Unremarkable.  No acute fracture.       Impression:      1.  Mild interstitial thickening with vascular congestion.  2.  Mild cardiomegaly.     This report was finalized on 6/4/2025 12:55 PM by Dr. Toñito Ma MD.       FL Surgery Fluoro [[id number]] Collected: 06/04/25 1120     Updated: 06/04/25 1122    Narrative:      EXAMINATION: FL SURGERY FLUORO-      CLINICAL INDICATION:     bronch; J98.59-Other diseases of mediastinum,  not elsewhere classified     TECHNIQUE:  FL SURGERY FLUORO-      FLUOROSCOPY TIME: 0.2 minutes.     FINDINGS:   Intraoperative fluoroscopy for navigational bronchoscopy.       Impression:      As above.     This report was finalized on 6/4/2025 11:20 AM by Dr. Toñito Ma MD.               Discharge Disposition/Discharge Medications/Discharge Appointments     Discharge Disposition:   Home or Self Care    Discharge Follow up:   Additional Instructions for the Follow-ups that You Need to Schedule       Discharge Follow-up with PCP   As directed       Currently Documented PCP:    Jose Coley APRN    PCP Phone Number:    [phone]     Follow Up Details: 1 wk               " Follow-up Information       Jose Coley, DAVID .    Specialty: Nurse Practitioner  Why: 1 wk  Contact information:  [address] [place] [address] [phone]                             Condition at Discharge:  Stable     Discharge Medications:     Your medication list        PAUSE taking these medications        Instructions Last Dose Given Next Dose Due   lisinopril 20 MG tablet  Wait to take this until your doctor or other care provider tells you to start again.  Commonly known as: PRINIVIL,ZESTRIL      Take 1 tablet by mouth Daily.              START taking these medications        Instructions Last Dose Given Next Dose Due   predniSONE 10 MG tablet  Commonly known as: DELTASONE  Start taking on: June 5, 2025      Take 4 tablets by mouth Daily for 2 days, THEN 3 tablets Daily for 2 days, THEN 2 tablets Daily for 2 days, THEN 1 tablet Daily for 2 days, THEN 0.5 tablets Daily for 2 days.              CONTINUE taking these medications        Instructions Last Dose Given Next Dose Due   albuterol sulfate  (90 Base) MCG/ACT inhaler  Commonly known as: PROVENTIL HFA;VENTOLIN HFA;PROAIR HFA      Inhale 1 puff Every 6 (Six) Hours As Needed for Wheezing or Shortness of Air.       methIMAzole 5 MG tablet  Commonly known as: TAPAZOLE      Take 1 tablet by mouth Every Other Day.       prednisoLONE acetate 1 % ophthalmic suspension  Commonly known as: PRED FORTE      Administer 1 drop to the right eye Daily.       Spiriva Respimat 1.25 MCG/ACT aerosol solution inhaler  Generic drug: Tiotropium Bromide Monohydrate      Inhale 1 puff 2 (Two) Times a Day.                 Where to Get Your Medications        These medications were sent to [facility] - [place], [place] - [address] [facility] [address]  - [phone] [facility] [phone]   [address] [facility] [address] [facility] [address]      Phone: [phone]   predniSONE 10 MG tablet          Discharge Diet:    Diet Order   Procedures    Diet: Regular/House; Fluid  Consistency: Thin (IDDSI 0)        Discharge Activity:  ad rebecca      Time spent on this discharge exceeded 30 minutes.

## 2025-06-05 NOTE — DISCHARGE PLACEMENT REQUEST
"Christal Perez (75 y.o. Female)       Date of Birth   1950    Social Security Number       Address   [address] [place] [address]    Home Phone   [phone]    MRN   [id number]       Church   None    Marital Status                               Admission Date   6/4/2025    Admission Type   Elective    Admitting Provider   Denisha Bernal DO    Attending Provider   Denisha Bernal DO    Department, Room/Bed   [address], 3309/2S       Discharge Date       Discharge Disposition   Home or Self Care    Discharge Destination                                 Attending Provider: Denisha Bernal DO    Allergies: Pneumococcal Vaccines, Sulfa Antibiotics, Statins    Isolation: None   Infection: None   Code Status: CPR    Ht: 166.4 cm (65.5\")   Wt: 69.5 kg (153 lb 3.2 oz)    Admission Cmt: None   Principal Problem: Mediastinal mass [J98.59]                   Active Insurance as of 6/4/2025       Primary Coverage       Payor Plan Insurance Group Employer/Plan Group    MEDICARE MEDICARE A & B        Payor Plan Address Payor Plan Phone Number Payor Plan Fax Number Effective Dates    [address] [phone]  4/1/2015 - None Entered    [place] [address]         Subscriber Name Subscriber Birth Date Member ID       CHRISTAL PEREZ 1950 [id number]               Secondary Coverage       Payor Plan Insurance Group Employer/Plan Group    [facility] [id number]       Payor Plan Address Payor Plan Phone Number Payor Plan Fax Number Effective Dates    [address] [address]   1/1/2024 - None Entered    [place] [address]         Subscriber Name Subscriber Birth Date Member ID       CHRISTAL PEREZ 1950 [id number]                     Emergency Contacts        (Rel.) Home Phone Work Phone Mobile Phone    LISBET PEREZ (Son) -- -- [phone]    colette chavez (Brother) [phone] -- --              Vicky Cerna, RN   Registered Nurse  Nursing  Nursing " Patient received prescription for Diflucan.    Please have patient follow up with the primary care provider "Note      Signed  Date of Service:  25 1215  Creation Time:  25 121     Signed        Pt ambulated approximately 60 feet without supplemental oxygen.  Pt's oxygen saturation level dropped to 84%.  Pt returned to bed, supplemental oxygen was reapplied, and pt's oxygen saturation arina above 90%.                 [address] [address]  Dept. Phone:  [phone]  Dept. Fax:  [phone] Date Ordered: 2025         Patient:  Christal Perez MRN:  [id number]   [address] [place] [address] :  1950  SSN:    Phone: [phone] Sex:  F     Weight: 69.5 kg (153 lb 3.2 oz)         Ht Readings from Last 1 Encounters:   25 166.4 cm (65.5\")         Oxygen Therapy         (Order ID: [id number])    Diagnosis:  Mediastinal mass (J98.59)  COPD with acute exacerbation (J44.1)  Asthma-COPD overlap syndrome (J44.89)   Quantity:  1     Oxygen Requirement: Continuous (Awake & Asleep)  Qualification for Continuous Home Oxygen: Oxygen Saturation <=88% at Rest (Awake) on Room Air  Delivery Modality: Nasal Cannula  Oxygen Flow Rate (LPM): 2  Duration: During Sleep  Duration: With Exertion  Duration: PRN  Equipment:  Oxygen Concentrator &  &  Portable Gaseous Oxygen System & Portable Oxygen Contents Gaseous &  Conserving Regulator  Provider Review Status: Provider has Reviewed Oxygen Saturation and Testing  Length of Need: 99 Months = Lifetime        Authorizing Provider's Phone: [phone]  Authorizing Provider:Mike Dykes PA-C  Authorizing Provider's NPI: [id number]  Order Entered By: Mike Dykes PA-C 2025 11:37 AM     Electronically signed by: Mike Dykes PA-C 2025 11:37 AM     "

## 2025-06-05 NOTE — PLAN OF CARE
Goal Outcome Evaluation:   Pt resting in bed this shift with no complaints voiced at this time. No visible acute s/s of distress noted at this time. Plan of care ongoing.                                          
Goal Outcome Evaluation:Pt admitted this shift s/p Bronchoscopy r/t decreas ed 02 sat despite increased 02 need of 5l/nc. Pt is A&O, denies SOA upon admission. 02 titrated down to 3l/nc; pt tolerating well with 02 sat of 93-95%. Pt tolerating PO intake without difficulties. Will continue to monitor & follow plan of care.                                            
Pt discharged home with brother. IV removed; telemetry leads removed; belongings gathered and returned with pt; and educated on discharge instructions. No s/s of acute distress noted. VSS  
0

## 2025-06-05 NOTE — NURSING NOTE
Pt ambulated approximately 60 feet without supplemental oxygen.  Pt's oxygen saturation level dropped to 84%.  Pt returned to bed, supplemental oxygen was reapplied, and pt's oxygen saturation arina above 90%.

## 2025-06-05 NOTE — NURSING NOTE
Pt being discharged home today on new o2 that has been delivered for discharged. BEN Perry made aware the discharge is complete. Family to provide transportation.

## 2025-06-06 ENCOUNTER — TELEPHONE (OUTPATIENT)
Dept: PULMONOLOGY | Facility: CLINIC | Age: 75
End: 2025-06-06

## 2025-06-06 DIAGNOSIS — C34.90 SMALL CELL CARCINOMA OF LUNG, UNSPECIFIED LATERALITY, UNSPECIFIED PART OF LUNG: Primary | ICD-10-CM

## 2025-06-06 LAB
BACTERIA SPEC AEROBE CULT: NORMAL
BACTERIA SPEC AEROBE CULT: NORMAL
BACTERIA SPEC RESP CULT: NORMAL
FUNGUS SPEC CULT: NORMAL
FUNGUS SPEC FUNGUS STN: NORMAL
GRAM STN SPEC: NORMAL
REF LAB TEST METHOD: NORMAL

## 2025-06-19 ENCOUNTER — LAB (OUTPATIENT)
Dept: ONCOLOGY | Facility: CLINIC | Age: 75
End: 2025-06-19
Payer: MEDICARE

## 2025-06-19 ENCOUNTER — CONSULT (OUTPATIENT)
Dept: ONCOLOGY | Facility: CLINIC | Age: 75
End: 2025-06-19
Payer: MEDICARE

## 2025-06-19 VITALS
DIASTOLIC BLOOD PRESSURE: 75 MMHG | RESPIRATION RATE: 20 BRPM | BODY MASS INDEX: 24.27 KG/M2 | HEART RATE: 93 BPM | TEMPERATURE: 97 F | WEIGHT: 151 LBS | OXYGEN SATURATION: 93 % | HEIGHT: 66 IN | SYSTOLIC BLOOD PRESSURE: 138 MMHG

## 2025-06-19 DIAGNOSIS — J98.59 MEDIASTINAL MASS: Primary | ICD-10-CM

## 2025-06-19 DIAGNOSIS — C34.81 SMALL CELL CARCINOMA OF OVERLAPPING SITES OF RIGHT LUNG: Primary | ICD-10-CM

## 2025-06-19 LAB
ALBUMIN SERPL-MCNC: 3.8 G/DL (ref 3.5–5.2)
ALBUMIN/GLOB SERPL: 1.3 G/DL
ALP SERPL-CCNC: 83 U/L (ref 39–117)
ALT SERPL W P-5'-P-CCNC: 14 U/L (ref 1–33)
ANION GAP SERPL CALCULATED.3IONS-SCNC: 9 MMOL/L (ref 5–15)
AST SERPL-CCNC: 18 U/L (ref 1–32)
BASOPHILS # BLD AUTO: 0.07 10*3/MM3 (ref 0–0.2)
BASOPHILS NFR BLD AUTO: 0.7 % (ref 0–1.5)
BILIRUB SERPL-MCNC: 0.2 MG/DL (ref 0–1.2)
BUN SERPL-MCNC: 9.6 MG/DL (ref 8–23)
BUN/CREAT SERPL: 13 (ref 7–25)
CALCIUM SPEC-SCNC: 8.9 MG/DL (ref 8.6–10.5)
CHLORIDE SERPL-SCNC: 102 MMOL/L (ref 98–107)
CO2 SERPL-SCNC: 27 MMOL/L (ref 22–29)
CREAT SERPL-MCNC: 0.74 MG/DL (ref 0.57–1)
DEPRECATED RDW RBC AUTO: 46.7 FL (ref 37–54)
EGFRCR SERPLBLD CKD-EPI 2021: 84.5 ML/MIN/1.73
EOSINOPHIL # BLD AUTO: 0.2 10*3/MM3 (ref 0–0.4)
EOSINOPHIL NFR BLD AUTO: 2.1 % (ref 0.3–6.2)
ERYTHROCYTE [DISTWIDTH] IN BLOOD BY AUTOMATED COUNT: 13.2 % (ref 12.3–15.4)
GLOBULIN UR ELPH-MCNC: 2.9 GM/DL
GLUCOSE SERPL-MCNC: 95 MG/DL (ref 65–99)
HCT VFR BLD AUTO: 50.2 % (ref 34–46.6)
HGB BLD-MCNC: 16 G/DL (ref 12–15.9)
IMM GRANULOCYTES # BLD AUTO: 0.04 10*3/MM3 (ref 0–0.05)
IMM GRANULOCYTES NFR BLD AUTO: 0.4 % (ref 0–0.5)
LYMPHOCYTES # BLD AUTO: 1.43 10*3/MM3 (ref 0.7–3.1)
LYMPHOCYTES NFR BLD AUTO: 15.1 % (ref 19.6–45.3)
MCH RBC QN AUTO: 30.2 PG (ref 26.6–33)
MCHC RBC AUTO-ENTMCNC: 31.9 G/DL (ref 31.5–35.7)
MCV RBC AUTO: 94.7 FL (ref 79–97)
MONOCYTES # BLD AUTO: 0.9 10*3/MM3 (ref 0.1–0.9)
MONOCYTES NFR BLD AUTO: 9.5 % (ref 5–12)
NEUTROPHILS NFR BLD AUTO: 6.85 10*3/MM3 (ref 1.7–7)
NEUTROPHILS NFR BLD AUTO: 72.2 % (ref 42.7–76)
NRBC BLD AUTO-RTO: 0 /100 WBC (ref 0–0.2)
PLATELET # BLD AUTO: 225 10*3/MM3 (ref 140–450)
PMV BLD AUTO: 10.6 FL (ref 6–12)
POTASSIUM SERPL-SCNC: 4.2 MMOL/L (ref 3.5–5.2)
PROT SERPL-MCNC: 6.7 G/DL (ref 6–8.5)
RBC # BLD AUTO: 5.3 10*6/MM3 (ref 3.77–5.28)
SODIUM SERPL-SCNC: 138 MMOL/L (ref 136–145)
WBC NRBC COR # BLD AUTO: 9.49 10*3/MM3 (ref 3.4–10.8)

## 2025-06-19 PROCEDURE — 80053 COMPREHEN METABOLIC PANEL: CPT | Performed by: INTERNAL MEDICINE

## 2025-06-19 PROCEDURE — 85025 COMPLETE CBC W/AUTO DIFF WBC: CPT | Performed by: INTERNAL MEDICINE

## 2025-06-19 RX ORDER — NICOTINE 21 MG/24HR
1 PATCH, TRANSDERMAL 24 HOURS TRANSDERMAL EVERY 24 HOURS
COMMUNITY
Start: 2025-06-18

## 2025-06-19 NOTE — PROGRESS NOTES
Venipuncture Blood Specimen Collection  Venipuncture performed in right arm by Mayela Onofre MA with good hemostasis. Patient tolerated the procedure well without complications.   06/19/25   Mayela Onofre MA

## 2025-06-19 NOTE — PROGRESS NOTES
Name:  Christal Perez  :  1950  Date:  2025     REFERRING PHYSICIAN  Torres Watts MD    PRIMARY CARE PROVIDER  Jose Coley, DAVID    REASON FOR CONSULTATION  1. Small cell carcinoma of overlapping sites of right lung      CHIEF COMPLAINT  Shortness of breath.    Dear Dr. Watts,    HISTORY OF PRESENT ILLNESS:   Thank you for referring Ms. Perez to our medical oncology clinic. As you are aware, she is a pleasant, 75 y.o., white female with a history of hypertension, hyperlipidemia, COPD and a large, thyroid goiter who you have been following in your pulmonology clinic since Spring 2024 for abnormal chest imaging. While chest CTs in 2024 and 2025 were both overall stable and unremarkable, showing the very enlarged thyroid and some small areas of linear lung scarring only, as part of a preop evaluation for possible surgery on the goiter, CTs of the neck and chest in May 2025 showed new (since 2025) right-sided, paratracheal adenopathy versus a mass. Consequently, you performed a diagnostic bronchoscopy on 2025. FNAs of the right hilar mass/adenopathy and a level 7 lymph node were positive for small cell carcinoma. You have now referred her to our clinic for further evaluation and management.    INTERIM HISTORY:  Ms. Perez presents to clinic today for initial consultation accompanied by her son. They confirm the above history. She was not on supplemental O2 until after the bronchoscopy earlier this month. While she has generally been staying on it during the day, she is able to go without it at times, typically at night, while she is at home and less active. Despite this, overall, she does not feel as though her chronic, baseline shortness of breath is significantly that much different now than it was earlier this year. The reason she was recently pursuing the goiter surgery/evaluation is that it has increased in size to the point that it has started to impact her PO  intake. Her  passed away from (what sounds like) nonsmall cell lung cancer a few years ago. She is not certain if she will agree to chemotherapy and/or localized irradiation. She has no other specific complaints in clinic today, including pain. She is a lifelong, ongoing smoker.    Past Medical History:   Diagnosis Date    Asthma     COPD (chronic obstructive pulmonary disease)     Disease of thyroid gland     GOITER    Fuchs' corneal dystrophy of right eye     HAD CORNEAL TRANSPLANT    History of transfusion     Hyperlipidemia     Hypertension     PONV (postoperative nausea and vomiting)        Past Surgical History:   Procedure Laterality Date    BRONCHOSCOPY Bilateral 6/4/2025    Procedure: BRONCHOSCOPY WITH ENDOBRONCHIAL ULTRASOUND AND NAVIGATION;  Surgeon: Torres Watts MD;  Location: [facility];  Service: Pulmonary;  Laterality: Bilateral;    CATARACT EXTRACTION      CHOLECYSTECTOMY      CORNEAL TRANSPLANT      EYE SURGERY      HYSTERECTOMY      ORIF WRIST FRACTURE Left        Social History     Socioeconomic History    Marital status:    Tobacco Use    Smoking status: Heavy Smoker     Current packs/day: 1.00     Average packs/day: 1 pack/day for 60.5 years (60.5 ttl pk-yrs)     Types: Cigarettes     Start date: 1965     Passive exposure: Current    Smokeless tobacco: Never   Vaping Use    Vaping status: Former    Substances: Nicotine, Flavoring    Devices: Disposable   Substance and Sexual Activity    Alcohol use: Not Currently    Drug use: Yes     Types: Hydromorphone    Sexual activity: Defer       Family History   Problem Relation Age of Onset    Lymphoma Mother     Lung cancer Father     Lung cancer Brother     Breast cancer Neg Hx        Allergies   Allergen Reactions    Pneumococcal Vaccines Swelling     Swollen where vaccination was given, injection site got red, and itching.    Sulfa Antibiotics Nausea Only    Statins Other (See Comments)     Leg pain and heaviness in legs.  "      Current Outpatient Medications   Medication Sig Dispense Refill    albuterol sulfate  (90 Base) MCG/ACT inhaler Inhale 1 puff Every 6 (Six) Hours As Needed for Wheezing or Shortness of Air.      methIMAzole (TAPAZOLE) 5 MG tablet Take 1 tablet by mouth Every Other Day. 45 tablet 1    nicotine (NICODERM CQ) 21 MG/24HR patch Place 1 patch on the skin as directed by provider Daily.      prednisoLONE acetate (PRED FORTE) 1 % ophthalmic suspension Administer 1 drop to the right eye Daily.  6    Tiotropium Bromide Monohydrate (Spiriva Respimat) 1.25 MCG/ACT aerosol solution inhaler Inhale 1 puff 2 (Two) Times a Day.      [Paused] lisinopril (PRINIVIL,ZESTRIL) 20 MG tablet Take 1 tablet by mouth Daily. (Patient not taking: Reported on 6/19/2025)       No current facility-administered medications for this visit.     REVIEW OF SYSTEMS  CONSTITUTIONAL:  No fever, chills, night sweats or fatigue.  EYES:  No blurry vision, diplopia or other vision changes.  ENT:  No hearing loss, nosebleeds or sore throat.  CARDIOVASCULAR:  No palpitations, arrhythmia, syncopal episodes or edema.  PULMONARY:  No hemoptysis, wheezing, chronic cough or shortness of breath.  GASTROINTESTINAL:  No nausea or vomiting.  No constipation or diarrhea.  No abdominal pain.  GENITOURINARY:  No hematuria, kidney stones or frequent urination.  MUSCULOSKELETAL:  No joint or back pains.  INTEGUMENTARY: No rashes or pruritus.  ENDOCRINE:  No excessive thirst or hot flashes.  HEMATOLOGIC:  No history of free bleeding, spontaneous bleeding or clotting.  IMMUNOLOGIC:  No allergies or frequent infections.  NEUROLOGIC: No numbness, tingling, seizures or weakness.  PSYCHIATRIC:  No anxiety or depression.    PHYSICAL EXAMINATION  /75   Pulse 93   Temp 97 °F (36.1 °C) (Temporal)   Resp 20   Ht 166.4 cm (65.5\")   Wt 68.5 kg (151 lb)   SpO2 93% Comment: on 2L of O2  BMI 24.75 kg/m²     Pain Score:  Pain Score    06/19/25 1439   PainSc: 0-No pain "     PHQ-Score Total:  PHQ-9 Total Score:      ECO  GENERAL:  A well-developed, well-nourished, thin, elderly, white female in no acute distress.  HEENT:  Pupils equally round and reactive to light. Extraocular muscles intact. Nasal cannula with supplemental O2 (from a portable concentrator) in place.  CARDIOVASCULAR:  Regular rate and rhythm. No murmurs, gallops or rubs.  LUNGS:  Occasional, mild wheezing bilaterally.  ABDOMEN:  Soft, nontender, nondistended with positive bowel sounds.  EXTREMITIES:  No clubbing, cyanosis or edema bilaterally.  SKIN:  No rashes or petechiae.  NEURO:  Cranial nerves grossly intact.  No focal deficits.  PSYCH:  Alert and oriented x3.    LABORATORY  Lab Results   Component Value Date    WBC 9.49 2025    HGB 16.0 (H) 2025    HCT 50.2 (H) 2025    MCV 94.7 2025     2025    NEUTROABS 6.85 2025       Lab Results   Component Value Date     2025    K 4.2 2025     2025    CO2 27.0 2025    BUN 9.6 2025    CREATININE 0.74 2025    GLUCOSE 95 2025    CALCIUM 8.9 2025    AST 18 2025    ALT 14 2025    ALKPHOS 83 2025    BILITOT 0.2 2025    PROTEINTOT 6.7 2025    ALBUMIN 3.8 2025     CBC (2025): WBCs: 9.49; HgB: 16.0; Hct: 50.2; platelets: 225  CBC (2025): WBCs: 15.35; HgB: 14.4; Hct: 44.7; platelets: 255    IMAGING  CT chest with contrast (04/10/2024):  Impression:  1) Proximal nodule posteriorly in the right lung could represent pleural scarring.  2) Other findings [are nonacute].    CT chest without contrast (2025, compared to 04/10/2024):  Impression:  1) No suspicious pulmonary nodules identified.  2) Linear scarring is noted of the right lower lobe without discrete nodule or consolidative airspace disease identified.  3) Diffusely enlarged multinodular thyroid goiter with substernal extension.  4) Linear scarring or atelectasis lingula  stable from previous.    CT soft tissue neck with contrast (05/18/2025):  Impression:  1) Abnormal enlarged right paratracheal lymph node versus paratracheal mass that is approximately 2.9 x 3.0 cm and is new from previous chest CT 01/20/2025. Recommend dedicated chest CT.  2) Diffusely enlarged thyroid gland with heterogeneous parenchyma and multiple hypodensities compatible with multinodular thyroid goiter. Substernal extension again noted.  3) Right lobe thyroid is 3.2 x 2.9 x 7.3 cm. Left lobe thyroid is 7.1 x 2.9 x 3.3 cm.  4) No cervical lymphadenopathy based on CT size criteria.  5) Other incidental/nonacute findings.    CT chest without contrast (05/28/2025):  Impression: Bilateral thyroid gland multiple nodules including a left inferior thyroid pole circumferentially calcified 1 cm nodule that appears stable.    PATHOLOGY  Bronchial lavage, left lower lobe (06/04/2025):  Rare atypical degenerated small cells, suspicious for small cell carcinoma.    Lymph node, FNA, station 7 (06/04/2025):  Malignant - metastatic small cell carcinoma.    Lymph node, FNA, right hilar mass (06/04/2025):  Malignant - metastatic small cell carcinoma.    IMPRESSION AND PLAN  Ms. Perez is a 75 y.o., white female with:  Small cell lung carcinoma: Diagnosed in late Spring 2025 with, in so far as is currently known, limited stage disease primarily involving the mediastinum. I had a long discussion with the patient and her son in clinic today regarding this diagnosis and, in general terms, its prognosis. In short, we discussed how, due to its typical aggressiveness and lack of surgical options, this malignancy is classically incurable; however, particularly given her decent performance status, it is potentially treatable; and sustained remissions are possible. She made it clear today that, particularly given what she witnessed her  go through a few years ago (he apparently received concomitant chemotherapy and localized  irradiation for NONsmall cell lung cancer but  within the following ~six months), and with an understandable focus on quality, rather than quantity, of life, she is not certain if she will agree to chemotherapy and/or radiation. For now, she is agreeable to our sending off both tissue (early 's bronchoscopy specimens) and liquid (peripheral blood drawn today) biopsies for next generation sequencing ([id number]) while we also obtain a NM PET scan to (hopefully) clarify whether she has limited or extensive stage disease. She wishes to hold off on powerport placement at this time. We will see her back in our clinic in two weeks, with the results of these studies, to finalize our initial palliative treatment/management plans.  Thyroid goiter: Issue #1, which is now the clear management priority, was incidentally diagnosed as part of a preoperative evaluation for this chronic issue in late Spring 2025. Continue to monitor.  The patient and her son were in agreement with these plans.    It is a pleasure to participate in Ms. Perez's care. Please do not hesitate to call with any questions or concerns that you may have.    A total of 60 minutes were spent coordinating this patient’s care in clinic today; more than 50% of this time was face-to-face with the patient and her son, reviewing her medical history, discussing the (to date) diagnosis and, in general terms, its prognosis and counseling on the current evaluation, potential treatment options and followup plans. All questions were answered to their satisfaction.    FOLLOW UP  Next generation sequencing ([id number]) requested on both tissue (early s bronchoscopy specimens) and liquid (peripheral blood drawn today) biopsies. Return to our clinic in 2 weeks (~early July) with a NM PET scan.          This document was electronically signed by SHAMAR Baeza MD 2025 20:01 EDT      CC: MD Jose Hendrickson APRN

## 2025-06-23 ENCOUNTER — PATIENT ROUNDING (BHMG ONLY) (OUTPATIENT)
Dept: ONCOLOGY | Facility: CLINIC | Age: 75
End: 2025-06-23
Payer: MEDICARE

## 2025-06-25 ENCOUNTER — HOSPITAL ENCOUNTER (OUTPATIENT)
Dept: PET IMAGING | Facility: HOSPITAL | Age: 75
Discharge: HOME | End: 2025-06-25
Admitting: INTERNAL MEDICINE
Payer: MEDICARE

## 2025-06-25 DIAGNOSIS — C34.81 SMALL CELL CARCINOMA OF OVERLAPPING SITES OF RIGHT LUNG: ICD-10-CM

## 2025-06-25 PROCEDURE — 34310000005 FLUDEOXYGLUCOSE F18 SOLUTION: Performed by: INTERNAL MEDICINE

## 2025-06-25 PROCEDURE — 78815 PET IMAGE W/CT SKULL-THIGH: CPT

## 2025-06-25 PROCEDURE — A9552 F18 FDG: HCPCS | Performed by: INTERNAL MEDICINE

## 2025-06-25 RX ADMIN — FLUDEOXYGLUCOSE F 18 1 DOSE: 200 INJECTION, SOLUTION INTRAVENOUS at 10:37

## 2025-07-02 ENCOUNTER — OFFICE VISIT (OUTPATIENT)
Dept: ONCOLOGY | Facility: CLINIC | Age: 75
End: 2025-07-02
Payer: MEDICARE

## 2025-07-02 VITALS
DIASTOLIC BLOOD PRESSURE: 71 MMHG | SYSTOLIC BLOOD PRESSURE: 121 MMHG | RESPIRATION RATE: 18 BRPM | HEART RATE: 88 BPM | WEIGHT: 155 LBS | HEIGHT: 66 IN | OXYGEN SATURATION: 93 % | TEMPERATURE: 98.4 F | BODY MASS INDEX: 24.91 KG/M2

## 2025-07-02 DIAGNOSIS — C34.81 SMALL CELL CARCINOMA OF OVERLAPPING SITES OF RIGHT LUNG: Primary | ICD-10-CM

## 2025-07-02 NOTE — PROGRESS NOTES
Name:  Christal Perez  :  1950  Date:  2025     REFERRING PHYSICIAN  Torres Watts MD    PRIMARY CARE PROVIDER  Jose Coley, DAVID    REASON FOR FOLLOWUP  1. Small cell carcinoma of overlapping sites of right lung      CHIEF COMPLAINT  Shortness of breath, about the same compared to a couple of weeks ago.    Dear Dr. Watts,    HISTORY OF PRESENT ILLNESS:   I saw Ms. Perez in follow up today in our medical oncology clinic. As you are aware, she is a pleasant, 75 y.o., white female with a history of hypertension, hyperlipidemia, COPD and a large, thyroid goiter who you have been following in your pulmonology clinic since Spring 2024 for abnormal chest imaging. While chest CTs in 2024 and 2025 were both overall stable and unremarkable, showing the very enlarged thyroid and some small areas of linear lung scarring only, as part of a preop evaluation for possible surgery on the goiter, CTs of the neck and chest in May 2025 showed new (since 2025) right-sided, paratracheal adenopathy versus a mass. Consequently, you performed a diagnostic bronchoscopy on 2025. FNAs of the right hilar mass/adenopathy and a level 7 lymph node were positive for small cell carcinoma. You have now referred her to our clinic for further evaluation and management.    INTERIM HISTORY:  Ms. Perez returns to clinic today for follow up again accompanied by her son (and, this time, her daughter-in-law as well). She was not on supplemental O2 until after the bronchoscopy in 2025. While she has generally been staying on it during the day, she is able to go without it at times, typically at night, while she is at home and less active. Despite this, overall, she still does not feel as though her chronic, baseline shortness of breath is significantly that much different now than it was earlier this year. The reason she was recently pursuing the goiter surgery/evaluation is that it has increased in size  to the point that it has started to impact her PO intake. Her  passed away from (what sounds like) nonsmall cell lung cancer a few years ago. She has no other specific complaints in clinic today, including pain. She is a lifelong, ongoing smoker.    Past Medical History:   Diagnosis Date    Asthma     COPD (chronic obstructive pulmonary disease)     Disease of thyroid gland     GOITER    Fuchs' corneal dystrophy of right eye     HAD CORNEAL TRANSPLANT    History of transfusion     Hyperlipidemia     Hypertension     PONV (postoperative nausea and vomiting)        Past Surgical History:   Procedure Laterality Date    BRONCHOSCOPY Bilateral 6/4/2025    Procedure: BRONCHOSCOPY WITH ENDOBRONCHIAL ULTRASOUND AND NAVIGATION;  Surgeon: Torres Watts MD;  Location: [facility];  Service: Pulmonary;  Laterality: Bilateral;    CATARACT EXTRACTION      CHOLECYSTECTOMY      CORNEAL TRANSPLANT      EYE SURGERY      HYSTERECTOMY      ORIF WRIST FRACTURE Left        Social History     Socioeconomic History    Marital status:    Tobacco Use    Smoking status: Heavy Smoker     Current packs/day: 1.00     Average packs/day: 1 pack/day for 60.5 years (60.5 ttl pk-yrs)     Types: Cigarettes     Start date: 1965     Passive exposure: Current    Smokeless tobacco: Never   Vaping Use    Vaping status: Former    Substances: Nicotine, Flavoring    Devices: Disposable   Substance and Sexual Activity    Alcohol use: Not Currently    Drug use: Yes     Types: Hydromorphone    Sexual activity: Defer       Family History   Problem Relation Age of Onset    Lymphoma Mother     Lung cancer Father     Lung cancer Brother     Breast cancer Neg Hx        Allergies   Allergen Reactions    Pneumococcal Vaccines Swelling     Swollen where vaccination was given, injection site got red, and itching.    Sulfa Antibiotics Nausea Only    Statins Other (See Comments)     Leg pain and heaviness in legs.       Current Outpatient Medications  "  Medication Sig Dispense Refill    albuterol sulfate  (90 Base) MCG/ACT inhaler Inhale 1 puff Every 6 (Six) Hours As Needed for Wheezing or Shortness of Air.      methIMAzole (TAPAZOLE) 5 MG tablet Take 1 tablet by mouth Every Other Day. 45 tablet 1    nicotine (NICODERM CQ) 21 MG/24HR patch Place 1 patch on the skin as directed by provider Daily.      prednisoLONE acetate (PRED FORTE) 1 % ophthalmic suspension Administer 1 drop to the right eye Daily.  6    [Paused] lisinopril (PRINIVIL,ZESTRIL) 20 MG tablet Take 1 tablet by mouth Daily. (Patient not taking: Reported on 6/19/2025)      Tiotropium Bromide Monohydrate (Spiriva Respimat) 1.25 MCG/ACT aerosol solution inhaler Inhale 1 puff 2 (Two) Times a Day. (Patient not taking: Reported on 7/2/2025)       No current facility-administered medications for this visit.     REVIEW OF SYSTEMS  CONSTITUTIONAL:  No fever, chills or night sweats.  EYES:  No blurry vision, diplopia or other vision changes.  ENT:  No hearing loss, nosebleeds or sore throat. Large goiter.  CARDIOVASCULAR:  No palpitations, arrhythmia, syncopal episodes or edema.  PULMONARY:  As per the HPI above.  GASTROINTESTINAL:  No nausea or vomiting. No constipation or diarrhea. No abdominal pain.  GENITOURINARY:  No hematuria, kidney stones or frequent urination.  MUSCULOSKELETAL:  No joint or back pains.  INTEGUMENTARY: No rashes or pruritus.  ENDOCRINE:  No excessive thirst or hot flashes.  HEMATOLOGIC:  No history of free bleeding, spontaneous bleeding or clotting.  IMMUNOLOGIC:  No allergies or frequent infections.  NEUROLOGIC: No numbness, tingling, seizures or weakness.  PSYCHIATRIC:  No anxiety or depression.    PHYSICAL EXAMINATION  /71   Pulse 88   Temp 98.4 °F (36.9 °C) (Temporal)   Resp 18   Ht 166.4 cm (65.51\")   Wt 70.3 kg (155 lb)   SpO2 93% Comment: 2 liters of O2  BMI 25.39 kg/m²     Pain Score:  Pain Score    07/02/25 1415   PainSc: 0-No pain     PHQ-Score " Total:  PHQ-9 Total Score:      ECO  GENERAL:  A well-developed, well-nourished, thin, elderly, white female in no acute distress.  HEENT:  Pupils equally round and reactive to light. Extraocular muscles intact. Nasal cannula with supplemental O2 (from a portable concentrator) again in place.  CARDIOVASCULAR:  Regular rate and rhythm. No murmurs, gallops or rubs.  LUNGS:  Decreased breath sounds on the right. Occasional, mild wheezing bilaterally.  ABDOMEN:  Soft, nontender, nondistended with positive bowel sounds.  EXTREMITIES:  No clubbing, cyanosis or edema bilaterally.  SKIN:  No rashes or petechiae.  NEURO:  Cranial nerves grossly intact.  No focal deficits.  PSYCH:  Alert and oriented x3.    LABORATORY  Lab Results   Component Value Date    WBC 9.49 2025    HGB 16.0 (H) 2025    HCT 50.2 (H) 2025    MCV 94.7 2025     2025    NEUTROABS 6.85 2025       Lab Results   Component Value Date     2025    K 4.2 2025     2025    CO2 27.0 2025    BUN 9.6 2025    CREATININE 0.74 2025    GLUCOSE 95 2025    CALCIUM 8.9 2025    AST 18 2025    ALT 14 2025    ALKPHOS 83 2025    BILITOT 0.2 2025    PROTEINTOT 6.7 2025    ALBUMIN 3.8 2025     CBC (2025): WBCs: 9.49; HgB: 16.0; Hct: 50.2; platelets: 225  CBC (2025): WBCs: 15.35; HgB: 14.4; Hct: 44.7; platelets: 255    IMAGING  CT chest with contrast (04/10/2024):  Impression:  1) Proximal nodule posteriorly in the right lung could represent pleural scarring.  2) Other findings [are nonacute].    CT chest without contrast (2025, compared to 04/10/2024):  Impression:  1) No suspicious pulmonary nodules identified.  2) Linear scarring is noted of the right lower lobe without discrete nodule or consolidative airspace disease identified.  3) Diffusely enlarged multinodular thyroid goiter with substernal extension.  4) Linear  scarring or atelectasis lingula stable from previous.    CT soft tissue neck with contrast (05/18/2025):  Impression:  1) Abnormal enlarged right paratracheal lymph node versus paratracheal mass that is approximately 2.9 x 3.0 cm and is new from previous chest CT 01/20/2025. Recommend dedicated chest CT.  2) Diffusely enlarged thyroid gland with heterogeneous parenchyma and multiple hypodensities compatible with multinodular thyroid goiter. Substernal extension again noted.  3) Right lobe thyroid is 3.2 x 2.9 x 7.3 cm. Left lobe thyroid is 7.1 x 2.9 x 3.3 cm.  4) No cervical lymphadenopathy based on CT size criteria.  5) Other incidental/nonacute findings.    CT chest without contrast (05/28/2025):  Impression: Bilateral thyroid gland multiple nodules including a left inferior thyroid pole circumferentially calcified 1 cm nodule that appears stable.    NM PET with fusion CT, skull base to mid-thigh (06/25/2025, compared to CT dated 05/28/2025):  Impression: Intense abnormal hypermetabolic activity corresponding to soft tissue in the mediastinum and right hilum, likely representing adenopathy.    PATHOLOGY  Bronchial lavage, left lower lobe (06/04/2025):  Rare atypical degenerated small cells, suspicious for small cell carcinoma.    Lymph node, FNA, station 7 (06/04/2025):  Malignant - metastatic small cell carcinoma.    Lymph node, FNA, right hilar mass (06/04/2025):  Malignant - metastatic small cell carcinoma.    Next generation sequencing ([id number]), lymph node, FNA, right hilar mass and peripheral blood (06/26/2025):  PD-L1 CPS: 1. TP53 Q104 alteration detected. PIK3CA amplification. MSI-High NOT detected.    IMPRESSION AND PLAN  Ms. Perez is a 75 y.o., white female with:  Small cell lung carcinoma: Diagnosed in late Spring 2025 with, per a NM PET scan performed on 06/25/2025 (and summarized above), limited stage disease primarily involving the mediastinum and right hilum. I had another long discussion with  the patient, her son (and, this time, her daughter-in-law as well) in clinic today regarding this (updated) diagnosis and, in general terms, its prognosis. We again discussed how, due to its typical aggressiveness and lack of surgical options, this malignancy is classically incurable; however, particularly given her decent performance status, it is potentially treatable; and sustained remissions are possible. Between her histology, limited stage and the NGS ([id number]) results (summarized above), standard first-line treatment for this malignancy is concomitant chemotherapy (three to four cycles of [id number] platinum and etoposide) throughout a ~seven-week course of localized irradiation to the right lung/mediastinum. She made it very clear today that, particularly given what she witnessed her  go through a few years ago (he apparently received concomitant chemotherapy and localized irradiation for NONsmall cell lung cancer but  within the following ~six months), and with an understandable focus on quality, rather than quantity, of life, she has now decided (and her family agrees) that she DOES NOT wish to pursue any active treatment, concomitant chemo/XRT are otherwise. Consequently, I offered to refer her to home hospice, as they would likely do the most to improve both her quality, and thereby her quantity as well, of life from this point; however, she declined this (at least at this time). She may wind up moving to the [facility] to stay with her son and his wife sooner rather than later. We will see her back in our clinic in two months or prn. She was instructed to call us at anytime between now and then if/when she changes her mind about hospice, as a referral can easily be arranged via phone.  Thyroid goiter: Issue #1 was incidentally diagnosed as part of a preoperative evaluation for this chronic issue in late Spring 2025.  The patient, her son and her daughter-in-law were in agreement with  these plans.    It is a pleasure to participate in Ms. Perez's care. Please do not hesitate to call with any questions or concerns that you may have.    A total of 50 minutes were spent coordinating this patient’s care in clinic today; more than 50% of this time was face-to-face with the patient, her son and her daughter-in-law, reviewing her interim medical history, discussing the results of last week's NM PET scan and recent NGS ([id number]) results, along with the (updated) diagnosis and, in general terms, its prognosis, and counseling on the current treatment options and followup plan. All questions were answered to their satisfaction.    FOLLOW UP  The patient and family declined active treatment; however, she also declined a referral to home hospice (at least at this time). Return to our clinic in 2 months or prn.          This document was electronically signed by SHAMAR Baeza MD July 2, 2025 16:00 EDT      CC: MD Jose Hendrickson APRN

## 2025-07-03 LAB
FUNGUS SPEC CULT: NORMAL
FUNGUS SPEC FUNGUS STN: NORMAL
REF LAB TEST METHOD: NORMAL

## 2025-07-17 ENCOUNTER — TELEPHONE (OUTPATIENT)
Dept: ONCOLOGY | Facility: CLINIC | Age: 75
End: 2025-07-17
Payer: MEDICARE

## 2025-07-17 NOTE — TELEPHONE ENCOUNTER
Caller: LISBET PETTY    Relationship: Emergency Contact    Best call back number: [phone]     What is the best time to reach you: ASAP    Who are you requesting to speak with (clinical staff, provider,  specific staff member): CLINICAL        What was the call regarding: PT'S SON WANTS TO SPEAK TO DR MENDIETA ABOUT POSSIBLY GOING WITH HOSPICE.  PT IS NOW STAYING WITH HIM IN [facility].  ALSO NEEDS TO ASK SOME OTHER QUESTIONS ABOUT DNR.  SAYS THE PT HAD ORIGINALLY SAID NO TO DNR BUT HAS NOW CHANGED HER MIND AND THEY ARE WORKING WITH AN , BUT HAVE FOUND OUT THERE ARE TWO DIFFERENT TYPES OF DNR'S, REGULAR AND EMS, AND THAT THE EMS DNR MAY NEED TO BE SIGNED BY A PHYSICIAN, THEY NEED ADVICE ON THIS.

## 2025-07-17 NOTE — TELEPHONE ENCOUNTER
RN spoke with Patient son, the patient wishes to pursue hospice at this time. Pt is living with her son in [place]. RN placed referral to [facility] navigators.

## 2025-07-19 LAB
ACID FAST STN SPEC: NEGATIVE
MYCOBACTERIUM SPEC QL CULT: NEGATIVE
SPECIMEN PREPARATION: NORMAL

## (undated) DEVICE — HOLDER: Brand: DEROYAL

## (undated) DEVICE — TUBING, SUCTION, 3/16" X 6', STRAIGHT: Brand: MEDLINE

## (undated) DEVICE — TRAP,MUCUS SPECIMEN,40CC: Brand: MEDLINE

## (undated) DEVICE — SYR LUER SLPTP 50ML

## (undated) DEVICE — GOWN,REINF,POLY,ECL,PP SLV,XL: Brand: MEDLINE

## (undated) DEVICE — BRUSH CYTO MULTI APPL

## (undated) DEVICE — SINGLE USE SUCTION VALVE MAJ-209: Brand: SINGLE USE SUCTION VALVE (STERILE)

## (undated) DEVICE — SUCTION CANISTER, 1500CC, RIGID: Brand: DEROYAL

## (undated) DEVICE — FRCP BX RADJAW4 PULM WO NDL STD1.8X2 100

## (undated) DEVICE — BIOPSY NEEDLE, 21G: Brand: FLEXISION

## (undated) DEVICE — SINGLE USE BIOPSY VALVE MAJ-210: Brand: SINGLE USE BIOPSY VALVE (STERILE)

## (undated) DEVICE — TUBING, SUCTION, 1/4" X 20', STRAIGHT: Brand: MEDLINE INDUSTRIES, INC.

## (undated) DEVICE — ADAPT SWVL FIBROPTIC BRONCH

## (undated) DEVICE — Device

## (undated) DEVICE — Device: Brand: SINGLE USE ASPIRATION NEEDLE NA-U401SX